# Patient Record
Sex: MALE | Race: WHITE | Employment: FULL TIME | URBAN - METROPOLITAN AREA
[De-identification: names, ages, dates, MRNs, and addresses within clinical notes are randomized per-mention and may not be internally consistent; named-entity substitution may affect disease eponyms.]

---

## 2018-08-08 ENCOUNTER — OFFICE VISIT - HEALTHEAST (OUTPATIENT)
Dept: FAMILY MEDICINE | Facility: CLINIC | Age: 33
End: 2018-08-08

## 2018-08-08 DIAGNOSIS — R79.89 ELEVATED LFTS: ICD-10-CM

## 2018-08-08 DIAGNOSIS — R73.03 PRE-DIABETES: ICD-10-CM

## 2018-08-08 DIAGNOSIS — E78.1 HYPERTRIGLYCERIDEMIA: ICD-10-CM

## 2018-08-08 DIAGNOSIS — E66.9 OBESE: ICD-10-CM

## 2018-08-08 DIAGNOSIS — H34.8392: ICD-10-CM

## 2018-08-08 DIAGNOSIS — Z91.89 AT RISK FOR SLEEP APNEA: ICD-10-CM

## 2018-08-08 DIAGNOSIS — Z00.00 ROUTINE GENERAL MEDICAL EXAMINATION AT A HEALTH CARE FACILITY: ICD-10-CM

## 2018-08-08 LAB
ALBUMIN SERPL-MCNC: 4.5 G/DL (ref 3.5–5)
ALP SERPL-CCNC: 67 U/L (ref 45–120)
ALT SERPL W P-5'-P-CCNC: 114 U/L (ref 0–45)
ANION GAP SERPL CALCULATED.3IONS-SCNC: 12 MMOL/L (ref 5–18)
AST SERPL W P-5'-P-CCNC: 43 U/L (ref 0–40)
BASOPHILS # BLD AUTO: 0.1 THOU/UL (ref 0–0.2)
BASOPHILS NFR BLD AUTO: 1 % (ref 0–2)
BILIRUB SERPL-MCNC: 0.6 MG/DL (ref 0–1)
BUN SERPL-MCNC: 17 MG/DL (ref 8–22)
C REACTIVE PROTEIN LHE: 0.2 MG/DL (ref 0–0.8)
CALCIUM SERPL-MCNC: 10 MG/DL (ref 8.5–10.5)
CHLORIDE BLD-SCNC: 105 MMOL/L (ref 98–107)
CHOLEST SERPL-MCNC: 225 MG/DL
CO2 SERPL-SCNC: 24 MMOL/L (ref 22–31)
CREAT SERPL-MCNC: 0.88 MG/DL (ref 0.7–1.3)
EOSINOPHIL # BLD AUTO: 0.2 THOU/UL (ref 0–0.4)
EOSINOPHIL NFR BLD AUTO: 4 % (ref 0–6)
ERYTHROCYTE [DISTWIDTH] IN BLOOD BY AUTOMATED COUNT: 11.5 % (ref 11–14.5)
ERYTHROCYTE [SEDIMENTATION RATE] IN BLOOD BY WESTERGREN METHOD: 2 MM/HR (ref 0–15)
FASTING STATUS PATIENT QL REPORTED: NO
GFR SERPL CREATININE-BSD FRML MDRD: >60 ML/MIN/1.73M2
GLUCOSE BLD-MCNC: 90 MG/DL (ref 70–125)
HBA1C MFR BLD: 6 % (ref 3.5–6)
HCT VFR BLD AUTO: 50.5 % (ref 40–54)
HDLC SERPL-MCNC: 39 MG/DL
HGB BLD-MCNC: 17.1 G/DL (ref 14–18)
LDLC SERPL CALC-MCNC: 112 MG/DL
LDLC SERPL CALC-MCNC: ABNORMAL MG/DL
LYMPHOCYTES # BLD AUTO: 2.1 THOU/UL (ref 0.8–4.4)
LYMPHOCYTES NFR BLD AUTO: 34 % (ref 20–40)
MCH RBC QN AUTO: 30.3 PG (ref 27–34)
MCHC RBC AUTO-ENTMCNC: 33.8 G/DL (ref 32–36)
MCV RBC AUTO: 90 FL (ref 80–100)
MONOCYTES # BLD AUTO: 0.6 THOU/UL (ref 0–0.9)
MONOCYTES NFR BLD AUTO: 9 % (ref 2–10)
NEUTROPHILS # BLD AUTO: 3.4 THOU/UL (ref 2–7.7)
NEUTROPHILS NFR BLD AUTO: 53 % (ref 50–70)
PLATELET # BLD AUTO: 203 THOU/UL (ref 140–440)
PMV BLD AUTO: 8.5 FL (ref 7–10)
POTASSIUM BLD-SCNC: 4.2 MMOL/L (ref 3.5–5)
PROT SERPL-MCNC: 7.7 G/DL (ref 6–8)
RBC # BLD AUTO: 5.63 MILL/UL (ref 4.4–6.2)
SODIUM SERPL-SCNC: 141 MMOL/L (ref 136–145)
TRIGL SERPL-MCNC: 540 MG/DL
TSH SERPL DL<=0.005 MIU/L-ACNC: 2.12 UIU/ML (ref 0.3–5)
WBC: 6.4 THOU/UL (ref 4–11)

## 2018-08-08 ASSESSMENT — MIFFLIN-ST. JEOR: SCORE: 1993.7

## 2018-08-09 ENCOUNTER — COMMUNICATION - HEALTHEAST (OUTPATIENT)
Dept: LAB | Facility: CLINIC | Age: 33
End: 2018-08-09

## 2018-08-10 ENCOUNTER — COMMUNICATION - HEALTHEAST (OUTPATIENT)
Dept: FAMILY MEDICINE | Facility: CLINIC | Age: 33
End: 2018-08-10

## 2018-08-10 LAB
PROT C ACT/NOR PPP CHRO: 115 % (ref 70–170)
PROT S FREE AG ACT/NOR PPP IA: 144 % (ref 70–148)

## 2018-08-13 ENCOUNTER — COMMUNICATION - HEALTHEAST (OUTPATIENT)
Dept: FAMILY MEDICINE | Facility: CLINIC | Age: 33
End: 2018-08-13

## 2018-08-13 LAB
ALBUMIN PERCENT: 64.5 % (ref 51–67)
ALBUMIN SERPL ELPH-MCNC: 4.9 G/DL (ref 3.2–4.7)
ALPHA 1 PERCENT: 2.1 % (ref 2–4)
ALPHA 2 PERCENT: 9.3 % (ref 5–13)
ALPHA1 GLOB SERPL ELPH-MCNC: 0.2 G/DL (ref 0.1–0.3)
ALPHA2 GLOB SERPL ELPH-MCNC: 0.7 G/DL (ref 0.4–0.9)
B-GLOBULIN SERPL ELPH-MCNC: 0.9 G/DL (ref 0.7–1.2)
BETA PERCENT: 11.4 % (ref 10–17)
GAMMA GLOB SERPL ELPH-MCNC: 1 G/DL (ref 0.6–1.4)
GAMMA GLOBULIN PERCENT: 12.7 % (ref 9–20)
PATH ICD:: ABNORMAL
PROT PATTERN SERPL ELPH-IMP: ABNORMAL
PROT SERPL-MCNC: 7.6 G/DL (ref 6–8)
REVIEWING PATHOLOGIST: ABNORMAL

## 2018-08-21 ENCOUNTER — COMMUNICATION - HEALTHEAST (OUTPATIENT)
Dept: FAMILY MEDICINE | Facility: CLINIC | Age: 33
End: 2018-08-21

## 2018-08-21 ENCOUNTER — AMBULATORY - HEALTHEAST (OUTPATIENT)
Dept: LAB | Facility: CLINIC | Age: 33
End: 2018-08-21

## 2018-08-21 DIAGNOSIS — H34.8392: ICD-10-CM

## 2018-08-21 DIAGNOSIS — R79.89 ELEVATED LFTS: ICD-10-CM

## 2018-08-21 DIAGNOSIS — E78.1 HYPERTRIGLYCERIDEMIA: ICD-10-CM

## 2018-08-21 LAB
ALBUMIN UR-MCNC: NEGATIVE MG/DL
APPEARANCE UR: CLEAR
BACTERIA #/AREA URNS HPF: ABNORMAL HPF
BILIRUB UR QL STRIP: NEGATIVE
COLOR UR AUTO: YELLOW
FERRITIN SERPL-MCNC: 226 NG/ML (ref 27–300)
GLUCOSE UR STRIP-MCNC: NEGATIVE MG/DL
HGB UR QL STRIP: ABNORMAL
IRON SATN MFR SERPL: 39 % (ref 20–50)
IRON SERPL-MCNC: 122 UG/DL (ref 42–175)
KETONES UR STRIP-MCNC: NEGATIVE MG/DL
LEUKOCYTE ESTERASE UR QL STRIP: NEGATIVE
NITRATE UR QL: NEGATIVE
PH UR STRIP: 5.5 [PH] (ref 5–8)
RBC #/AREA URNS AUTO: ABNORMAL HPF
SP GR UR STRIP: 1.02 (ref 1–1.03)
SQUAMOUS #/AREA URNS AUTO: ABNORMAL LPF
TIBC SERPL-MCNC: 315 UG/DL (ref 313–563)
TRANSFERRIN SERPL-MCNC: 252 MG/DL (ref 212–360)
UROBILINOGEN UR STRIP-ACNC: ABNORMAL
WBC #/AREA URNS AUTO: ABNORMAL HPF

## 2018-08-22 LAB
HBV CORE AB SERPL QL IA: NEGATIVE
HBV SURFACE AG SERPL QL IA: NEGATIVE
HCV AB SERPL QL IA: NEGATIVE
HEPATITIS B SURFACE ANTIBODY LHE- HISTORICAL: POSITIVE

## 2018-08-27 LAB — FACTOR 5 LEIDEN MUTAT PCR - HISTORICAL: NORMAL

## 2018-08-28 ENCOUNTER — COMMUNICATION - HEALTHEAST (OUTPATIENT)
Dept: INTERNAL MEDICINE | Facility: CLINIC | Age: 33
End: 2018-08-28

## 2018-08-28 ENCOUNTER — COMMUNICATION - HEALTHEAST (OUTPATIENT)
Dept: FAMILY MEDICINE | Facility: CLINIC | Age: 33
End: 2018-08-28

## 2018-08-29 ENCOUNTER — COMMUNICATION - HEALTHEAST (OUTPATIENT)
Dept: ADMINISTRATIVE | Facility: CLINIC | Age: 33
End: 2018-08-29

## 2018-08-29 ENCOUNTER — COMMUNICATION - HEALTHEAST (OUTPATIENT)
Dept: FAMILY MEDICINE | Facility: CLINIC | Age: 33
End: 2018-08-29

## 2018-09-18 ENCOUNTER — RECORDS - HEALTHEAST (OUTPATIENT)
Dept: ADMINISTRATIVE | Facility: OTHER | Age: 33
End: 2018-09-18

## 2018-12-12 ENCOUNTER — OFFICE VISIT - HEALTHEAST (OUTPATIENT)
Dept: FAMILY MEDICINE | Facility: CLINIC | Age: 33
End: 2018-12-12

## 2018-12-12 ENCOUNTER — COMMUNICATION - HEALTHEAST (OUTPATIENT)
Dept: TELEHEALTH | Facility: CLINIC | Age: 33
End: 2018-12-12

## 2018-12-12 DIAGNOSIS — J06.9 VIRAL UPPER RESPIRATORY TRACT INFECTION: ICD-10-CM

## 2018-12-12 DIAGNOSIS — J20.9 ACUTE BRONCHITIS, UNSPECIFIED ORGANISM: ICD-10-CM

## 2018-12-12 RX ORDER — ALBUTEROL SULFATE 90 UG/1
2 AEROSOL, METERED RESPIRATORY (INHALATION) EVERY 6 HOURS PRN
Qty: 1 EACH | Refills: 0 | Status: SHIPPED | OUTPATIENT
Start: 2018-12-12 | End: 2023-07-31

## 2018-12-12 ASSESSMENT — MIFFLIN-ST. JEOR: SCORE: 2020.91

## 2018-12-24 ENCOUNTER — COMMUNICATION - HEALTHEAST (OUTPATIENT)
Dept: SCHEDULING | Facility: CLINIC | Age: 33
End: 2018-12-24

## 2018-12-28 ENCOUNTER — COMMUNICATION - HEALTHEAST (OUTPATIENT)
Dept: SCHEDULING | Facility: CLINIC | Age: 33
End: 2018-12-28

## 2019-01-02 ENCOUNTER — OFFICE VISIT - HEALTHEAST (OUTPATIENT)
Dept: FAMILY MEDICINE | Facility: CLINIC | Age: 34
End: 2019-01-02

## 2019-01-02 DIAGNOSIS — R05.9 COUGH: ICD-10-CM

## 2019-01-02 DIAGNOSIS — H34.8122 CENTRAL RETINAL VEIN OCCLUSION OF LEFT EYE, UNSPECIFIED COMPLICATION STATUS (H): ICD-10-CM

## 2019-01-02 RX ORDER — CODEINE PHOSPHATE AND GUAIFENESIN 10; 100 MG/5ML; MG/5ML
10 SOLUTION ORAL 3 TIMES DAILY PRN
Qty: 240 ML | Refills: 0 | Status: SHIPPED | OUTPATIENT
Start: 2019-01-02 | End: 2023-07-31

## 2019-01-02 RX ORDER — CYCLOBENZAPRINE HCL 10 MG
5 TABLET ORAL EVERY 8 HOURS PRN
Qty: 30 TABLET | Refills: 1 | Status: SHIPPED | OUTPATIENT
Start: 2019-01-02 | End: 2023-07-31

## 2021-05-26 ENCOUNTER — RECORDS - HEALTHEAST (OUTPATIENT)
Dept: ADMINISTRATIVE | Facility: CLINIC | Age: 36
End: 2021-05-26

## 2021-06-01 VITALS — BODY MASS INDEX: 33.5 KG/M2 | HEIGHT: 70 IN | WEIGHT: 234 LBS

## 2021-06-02 VITALS — BODY MASS INDEX: 31.87 KG/M2 | WEIGHT: 228.5 LBS

## 2021-06-02 VITALS — BODY MASS INDEX: 34.36 KG/M2 | HEIGHT: 70 IN | WEIGHT: 240 LBS

## 2021-06-16 PROBLEM — E78.1 HYPERTRIGLYCERIDEMIA: Status: ACTIVE | Noted: 2018-08-09

## 2021-06-16 PROBLEM — E66.9 OBESE: Status: ACTIVE | Noted: 2018-08-08

## 2021-06-16 PROBLEM — R79.89 ELEVATED LFTS: Status: ACTIVE | Noted: 2018-08-09

## 2021-06-16 PROBLEM — R73.03 PRE-DIABETES: Status: ACTIVE | Noted: 2018-08-09

## 2021-06-16 PROBLEM — H34.8392: Status: ACTIVE | Noted: 2018-08-08

## 2021-06-19 NOTE — PROGRESS NOTES
"[facility] Clinic Note    Patient Name: Vinayak Jarquin  Patient Age: 33 y.o.  YOB: 1985  MRN: [id number]    Date of visit: 8/8/2018    Patient Active Problem List   Diagnosis     Exanthem     Venous retinal branch occlusion     Obese     Social History     Social History Narrative     No narrative on file     No family history on file.  No past surgical history on file.  No outpatient encounter prescriptions on file as of 8/8/2018.     No facility-administered encounter medications on file as of 8/8/2018.        Chief Complaint:   Chief Complaint   Patient presents with     Annual Exam     Would like some blood work done. Is getting another injection in the left eye. Has already has one.        HPI:   Occupation:[employer]  Marital status:single  Number of children:no  Living situation:girlfriend  Diet:poor  Exercise:Doesn't exercise  Alcohol use:weekends only, 10/week  Tobacco use:no  Illicit drug use:no  Depression:no  Last tetanus:?  HIV testing:?  Lipids/glucose:somewhat high  Blood pressure:always high end.    Mother/Father/Siblings MI:no  Fam hx colon or prostate cancer or kidney disease:no  Spirituality:no  Last visit to dentist:y  Optometrist:recent    No urination difficulty.  No ED    No cp/shob    Left central vein occlusion. Seeing a vitreoretinal surgeon for this, getting injections for this.  No hx dvt or pe.  No family hx blood clots.          Wt Readings from Last 3 Encounters:   08/08/18 (!) 234 lb (106.1 kg)     BP Readings from Last 3 Encounters:   08/08/18 132/84       ROS: Pertinent ros findings in hpi, all other systems negative.  Objective/Physical Exam:     /84  Pulse 86  Ht 5' 9.75\" (1.772 m)  Wt (!) 234 lb (106.1 kg)  BMI 33.82 kg/m2    Gen: NAD, conversant, appears age, well-kempt  Skin: warm, dry, no rash, pallor cyanosis  HENT: normocephalic atraumatic, MMM, no oral lesions, otorrhea, rhinorrhea. TM's normal bilaterally.  Eyes: non-icteric, extra-ocular " movements intact, PERRL, conjunctivae not injected. Holding eyes open comfortably, no drainage.  CV: NRRR no m/r/g, no peripheral edema. no JVD.  Resp: CTAB no w/r/r, normal respiratory effort  GI: soft, non-tender, non-distended. No masses.  MSK: no muscle or joint swelling.  Neuro: no dysarthria or gross asymmetry  Psych: full affect, oriented x 3  Lymph: No significant cervical lymphadenopathy  Hematologic: No petechiae or purpura.    Normal male external genitalia    ______________________________________________________________________    STOPBANG CHELSEY ASSESSMENT    1.  Do you snore loudly (louder than talking or loud enough to be heard through closed doors):  somewhat    2.  Do you often feel tired, fatigued, or sleepy during the day:  n    3.  Has anyone observed you stop breathing during sleep:  y    4.  Do you have or are you being treated for high blood pressure:  n    5.  Body mass index > 35:  Body mass index is 33.82 kg/(m^2).    6.  Age > 50:  33 y.o.     7.  Neck circumference greater than 40 cm:  y    8.  Gender male:  male     Total score:  4    A score of 3 or greater indicates a risk for sleep apnea (84% sensitivity).  A score of 5 or greater is more predictive of clinically relevant moderate to severe obstructive sleep apnea.    ______________________________________________________________________        Assessment/Plan:  No results found for this or any previous visit (from the past 24 hour(s)).    Wt Readings from Last 3 Encounters:   08/08/18 (!) 234 lb (106.1 kg)     BP Readings from Last 3 Encounters:   08/08/18 132/84       No Data Recorded  No Data Recorded    Vaccinations: tdap        ICD-10-CM    1. Routine general medical examination at a health care facility Z00.00 Lipid Cascade     Glycosylated Hemoglobin A1c     Comprehensive Metabolic Panel     HM1(CBC and Differential)     Thyroid Stimulating Hormone (TSH)     Erythrocyte Sedimentation Rate     C-Reactive Protein     Factor 5 Assay      Protein C Activity ([facility])     Protein S Antigen, Free (PSF)     Electrophoresis, Protein, Serum     HM1 (CBC with Diff)   2. Obese E66.9 Lipid Cascade     Glycosylated Hemoglobin A1c     Comprehensive Metabolic Panel     HM1(CBC and Differential)     Thyroid Stimulating Hormone (TSH)     Erythrocyte Sedimentation Rate     C-Reactive Protein     Factor 5 Assay     Protein C Activity (PCC[facility])     Protein S Antigen, Free (PSF)     Electrophoresis, Protein, Serum     HM1 (CBC with Diff)   3. At risk for sleep apnea Z91.89 Ambulatory referral to Sleep Medicine   4. Venous retinal branch occlusion H34.8392 Lipid Cascade     Glycosylated Hemoglobin A1c     Comprehensive Metabolic Panel     HM1(CBC and Differential)     Thyroid Stimulating Hormone (TSH)     Erythrocyte Sedimentation Rate     C-Reactive Protein     Factor 5 Assay     Protein C Activity (PCC[facility])     Protein S Antigen, Free (PSF)     Electrophoresis, Protein, Serum     HM1 (CBC with Diff)       I am doing a hypercoagulability workup requested by his retinal specialist, we may consider doing further tests if these come back normal.  However, other testing is somewhat expensive.  We discussed at length dietary and lifestyle changes.  Lab work as above.  I recommend sleep study because he is at risk for sleep apnea.  He would like results faxed to [phone] Dr. Gray  We also discussed whether or not he would need long-term anticoagulation - will await results from these tests, may consult hematology but at this point retinal specialist has not recommended it.        Patient Instructions   Plant Based Diet Handout    Eat abundant vegetables.    Only eat whole grains.    2-4 fruits/day.    Enjoy at least 2 servings of legumes (beans) or tofu daily.      Avoid animal products such as dairy, eggs and meat. (Replace these with 1,000mcg vitamin B12 and a calcium/vitamin D supplement such as Caltrate+D3 daily.)    Avoid processed foods, sugars and oils.    Cook  your own food as much as possible.    Keep a food diary and ask someone else to diet with you.    Drink 8 glasses of water a day.    [place] [employer] Documentary - watch online.        Counseled patient regarding healthy lifestyle including exercise, healthy eating. I recommend seeing optometrist and dentist regularly.    Counseled patient regarding treatments, treatment options, risks and benefits and diagnosis.  The patient was interactive, attentive, verbalized understanding, and we discussed plan.     Wolf Pacheco MD

## 2021-06-22 NOTE — PROGRESS NOTES
"Family Medicine Office Visit  [facility]  Patient Name: Vinayak Jarquin  Patient Age: 33 y.o.  YOB: 1985  MRN: [id number]    Date of Visit: 2019  Reason for Office Visit:   Chief Complaint   Patient presents with     Hospital Visit Follow Up     [facility] 17 for chest pain form coughing Continous Cough for 2 mo           Assessment / Plan / Medical Decision Makin. Cough  Will change antibiotic to doxycycline and see if any improvement.  Called in cough syrup.  Planned to do steroid, however pt has a hx of CRVO clot and currently seeing ophthalmology.      2.  Central retinal vein occulsion  Continue to follow up with ophthalmology        Health Maintenance Review  Health Maintenance   Topic Date Due     ADVANCE DIRECTIVES DISCUSSED WITH PATIENT  2002     INFLUENZA VACCINE RULE BASED (1) 2018     TD 18+ HE  2028     TDAP ADULT ONE TIME DOSE  Completed         I have discontinued Vinayak Jarquin's benzonatate, oxyCODONE, and methylPREDNISolone. I am also having him start on doxycycline, codeine-guaiFENesin, and cyclobenzaprine. Additionally, I am having him maintain his albuterol.      HPI:  Vinayak Jarquin is a 33 y.o. year old who presents to the office today for follow up on coughing.  Still coughing daily - went to the ER on  after coughing fit and felt like something \"popped\" on the left side of the chest.  Given oxycodone for the pain but did not help with the coughing.  Finished z pack and didn't feel like it helped at all.  Using inhaler and feels like it helps some.  CXR done and normal.  Still having pain in the rib area.  Coughing so much that he reports vomited once.  Hx of cental retinal vein occulsion and currently seeing ophthalmology for eye injections.      Review of Systems- pertinent positive in bold:  Constitutional: Fever, chills, night sweats, fainting, weight change, fatigue, seizures, dizziness, sleeping " difficulties, loud snoring/pauses in breathing  Eyes: change in vision, blurred or double vision, redness/eye pain  Ears, nose, mouth, throat: change in hearing, ear pain, hoarseness, difficulty swallowing, sores in the mouth or throat  Respiratory: shortness of breath, cough, bloody sputum, wheezing  Cardiovascular: chest pain, palpitations   Gastrointestinal: abdominal pain, heartburn/indigestion, nausea/vomiting, change in appetite, change in bowel habits, constipation or diarrhea, rectal bleeding/dark stools, difficulty swallowing  Urinary: painful urination, frequent urination, urinary urgency/incontinence, blood in urine/dark urine, nocturia  Musculoskeletal: backache/back pain (new or increasing), weakness, joint pain/stiffness (new or increasing), muscle cramps, swelling of hands, feet, ankles, leg pain/redness  Skin: change in moles/freckles, rash, nodules  Hematologic/lymphatic: swollen lymph glands, abnormal bruising/bleeding  Endocrine: excessive thirst/urination, cold or heat intolerance  Neurologic/emotional: worrisome memory change, numbness/tingling, anxiety, mood swings      Current Scheduled Meds:  Outpatient Encounter Medications as of 1/2/2019   Medication Sig Dispense Refill     albuterol (PROAIR HFA;PROVENTIL HFA;VENTOLIN HFA) 90 mcg/actuation inhaler Inhale 2 puffs every 6 (six) hours as needed for wheezing. 1 each 0     codeine-guaiFENesin (GUAIFENESIN AC)  mg/5 mL liquid Take 10 mL by mouth 3 (three) times a day as needed for cough. 240 mL 0     cyclobenzaprine (FLEXERIL) 10 MG tablet Take 0.5 tablets (5 mg total) by mouth every 8 (eight) hours as needed for muscle spasms. 30 tablet 1     doxycycline (VIBRA-TABS) 100 MG tablet Take 1 tablet (100 mg total) by mouth 2 (two) times a day for 10 days. 20 tablet 0     [DISCONTINUED] benzonatate (TESSALON PERLES) 100 MG capsule Take 1 capsule (100 mg total) by mouth 3 (three) times a day as needed for cough. 30 capsule 0     [DISCONTINUED]  methylPREDNISolone (MEDROL DOSEPACK) 4 mg tablet Take 1 tablet (4 mg total) by mouth daily for 6 days. Follow package directions 21 tablet 0     [DISCONTINUED] oxyCODONE (OXY-IR) 5 mg capsule Take 1 capsule (5 mg total) by mouth every 4 (four) hours as needed. 13 capsule 0     Facility-Administered Encounter Medications as of 1/2/2019   Medication Dose Route Frequency Provider Last Rate Last Dose     [DISCONTINUED] triamcinolone acetonide 40 mg/mL injection 40 mg (KENALOG-40)  40 mg Intramuscular Once Neha Watkins MD         No past medical history on file.  No past surgical history on file.  Social History     Tobacco Use     Smoking status: Never Smoker     Smokeless tobacco: Never Used   Substance Use Topics     Alcohol use: Not on file     Drug use: Not on file       Objective / Physical Examination:  Vitals:    01/02/19 1008   BP: 142/90   Patient Site: Right Arm   Patient Position: Sitting   Cuff Size: Adult Large   Pulse: 82   Temp: 98.1  F (36.7  C)   TempSrc: Oral   SpO2: 96%   Weight: (!) 228 lb 8 oz (103.6 kg)     Wt Readings from Last 3 Encounters:   01/02/19 (!) 228 lb 8 oz (103.6 kg)   12/24/18 (!) 230 lb (104.3 kg)   12/12/18 (!) 240 lb (108.9 kg)     BP Readings from Last 3 Encounters:   01/02/19 142/90   12/24/18 (!) 167/101   12/12/18 128/80     Body mass index is 31.87 kg/m .     General Appearance: Alert and oriented, cooperative, affect appropriate, speech clear, in no apparent distress  Head: Normocephalic, atraumatic  Ears: Tympanic membrane clear with landmarks well visualized bilaterally  Eyes: PERRL, fundi appear clear bilaterally. EOMI. Conjunctivae clear and sclerae non-icteric  Nose: Septum midline, nares patent, no visible polyps, mucosa moist and without drainage  Throat: Lips and mucosa moist. Teeth in good repair, pharynx without erythema or exudate  Neck: Supple, trachea midline. No cervical adenopathy  Back: Symmetrical and nontender  Lungs: Clear to auscultation bilaterally.  Normal inspiratory and expiratory effort  Cardiovascular: Regular rate, normal S1, S2. No murmurs, rubs, or gallops  Abdomen: Bowel sounds active all four quadrants. Soft, non-tender. No hepatomegaly or splenomegaly. No bruits detected.   Extremities: Pulses 2+ and equal throughout. No edema. Strength equal throughout.  Integumentary: Warm and dry. Without suspicious looking lesions  Neuro: Alert and oriented, follows commands appropriately.     No orders of the defined types were placed in this encounter.  Followup: Return in about 4 days (around 1/6/2019) for Recheck. earlier if needed.    Total time spent with patient was 30 min with >50% of time spent in face-to-face counseling regarding the above plan       Neha Watkins MD

## 2021-06-22 NOTE — PROGRESS NOTES
Family Medicine Office Visit  [facility]  Patient Name: Vinayak Jarquin  Patient Age: 33 y.o.  YOB: 1985  MRN: [id number]    Date of Visit: 2018  Reason for Office Visit:   Chief Complaint   Patient presents with     Cough     x 2 weeks. Low energy. SOB with exertion.            Assessment / Plan / Medical Decision Makin. Viral upper respiratory tract infection  Continue with ibuprofen/tylenol for muscle aches and sudafed as needed for congestion.    2. Acute bronchitis, unspecified organism  Called in z pack, tessalon perles and albuterol to help.  If no improvement by Friday, call the office or return to clinic.        Health Maintenance Review  Health Maintenance   Topic Date Due     ADVANCE DIRECTIVES DISCUSSED WITH PATIENT  2002     INFLUENZA VACCINE RULE BASED (1) 2018     TD 18+ HE  2028     TDAP ADULT ONE TIME DOSE  Completed         I am having Vinayak Jarquin start on azithromycin, benzonatate, and albuterol.      HPI:  Vinayak Jarquin is a 33 y.o. year old who presents to the office today for coughing, congestion, body aches and fevers for the past 2 weeks.   Fever highest temp of 102 but now resolved.  Coughing and bringing up yellow sputum.  Ha due to pressure he thinks, no fevers in the past 5 days.  Sore throat, ears itchying but no pain, no sinus tenderness.  Coughing keeping him up at night.        Review of Systems- pertinent positive in bold:  Constitutional: Fever, chills, night sweats, fainting, weight change, fatigue, seizures, dizziness, sleeping difficulties, loud snoring/pauses in breathing  Eyes: change in vision, blurred or double vision, redness/eye pain  Ears, nose, mouth, throat: change in hearing, ear pain, hoarseness, difficulty swallowing, sores in the mouth or throat  Respiratory: shortness of breath, cough, bloody sputum, wheezing  Cardiovascular: chest pain, palpitations   Gastrointestinal: abdominal  "pain, heartburn/indigestion, nausea/vomiting, change in appetite, change in bowel habits, constipation or diarrhea, rectal bleeding/dark stools, difficulty swallowing  Urinary: painful urination, frequent urination, urinary urgency/incontinence, blood in urine/dark urine, nocturia  Musculoskeletal: backache/back pain (new or increasing), weakness, joint pain/stiffness (new or increasing), muscle cramps, swelling of hands, feet, ankles, leg pain/redness  Skin: change in moles/freckles, rash, nodules  Hematologic/lymphatic: swollen lymph glands, abnormal bruising/bleeding  Endocrine: excessive thirst/urination, cold or heat intolerance  Neurologic/emotional: worrisome memory change, numbness/tingling, anxiety, mood swings      Current Scheduled Meds:  Outpatient Encounter Medications as of 12/12/2018   Medication Sig Dispense Refill     albuterol (PROAIR HFA;PROVENTIL HFA;VENTOLIN HFA) 90 mcg/actuation inhaler Inhale 2 puffs every 6 (six) hours as needed for wheezing. 1 each 0     azithromycin (ZITHROMAX Z-MILTON) 250 MG tablet Take 2 tablets (500 mg) on  Day 1,  followed by 1 tablet (250 mg) once daily on Days 2 through 5.. 6 tablet 0     benzonatate (TESSALON PERLES) 100 MG capsule Take 1 capsule (100 mg total) by mouth 3 (three) times a day as needed for cough. 30 capsule 0     No facility-administered encounter medications on file as of 12/12/2018.      No past medical history on file.  No past surgical history on file.  Social History     Tobacco Use     Smoking status: Never Smoker     Smokeless tobacco: Never Used   Substance Use Topics     Alcohol use: Not on file     Drug use: Not on file       Objective / Physical Examination:  Vitals:    12/12/18 0701   BP: 128/80   Pulse: 80   Resp: 16   Temp: 98  F (36.7  C)   SpO2: 93%   Weight: (!) 240 lb (108.9 kg)   Height: 5' 9.75\" (1.772 m)     Wt Readings from Last 3 Encounters:   12/12/18 (!) 240 lb (108.9 kg)   08/08/18 (!) 234 lb (106.1 kg)     BP Readings from " Last 3 Encounters:   12/12/18 128/80   08/08/18 132/84     Body mass index is 34.68 kg/m .     General Appearance: Alert and oriented, cooperative, affect appropriate, speech clear, in no apparent distress  Head: Normocephalic, atraumatic  Ears: Tympanic membrane clear with landmarks well visualized bilaterally  Eyes: PERRL, fundi appear clear bilaterally. EOMI. Conjunctivae clear and sclerae non-icteric  Nose: Septum midline, nares patent, no visible polyps, mucosa moist and without drainage  Throat: Lips and mucosa moist. Teeth in good repair, pharynx without erythema or exudate  Neck: Supple, trachea midline. No cervical adenopathy  Back: Symmetrical and nontender  Lungs: Clear to auscultation bilaterally. Normal inspiratory and expiratory effort  Cardiovascular: Regular rate, normal S1, S2. No murmurs, rubs, or gallops  Abdomen: Bowel sounds active all four quadrants. Soft, non-tender. No hepatomegaly or splenomegaly. No bruits detected.   Extremities: Pulses 2+ and equal throughout. No edema. Strength equal throughout.  Integumentary: Warm and dry. Without suspicious looking lesions  Neuro: Alert and oriented, follows commands appropriately.     No orders of the defined types were placed in this encounter.  Followup: No Follow-up on file. earlier if needed.    Total time spent with patient was 15 min with >50% of time spent in face-to-face counseling regarding the above plan       Neha Watkins MD

## 2021-07-03 NOTE — ADDENDUM NOTE
Addendum Note by Drew Pacheco MD at 8/9/2018  9:57 AM     Author: Drew Pacheco MD Service: -- Author Type: Physician    Filed: 8/9/2018  9:57 AM Encounter Date: 8/8/2018 Status: Signed    : Drew Pacheco MD (Physician)    Addended by: DREW PACHECO on: 8/9/2018 09:57 AM        Modules accepted: Orders

## 2021-07-03 NOTE — ADDENDUM NOTE
Addendum Note by Edilia Fitzpatrick MLT at 8/9/2018  5:35 PM     Author: Edilia Fitzpatrick MLT Service: -- Author Type:     Filed: 8/9/2018  5:35 PM Encounter Date: 8/8/2018 Status: Signed    : Edilia Fitzpatrick MLT ()    Addended by: EDILIA FITZPATRICK on: 8/9/2018 05:35 PM        Modules accepted: Orders

## 2021-07-03 NOTE — ADDENDUM NOTE
Addendum Note by Drew Pacheco MD at 8/9/2018  2:33 PM     Author: Drew Pacheco MD Service: -- Author Type: Physician    Filed: 8/9/2018  2:33 PM Encounter Date: 8/8/2018 Status: Signed    : Drew Pacheco MD (Physician)    Addended by: DREW PACHECO on: 8/9/2018 02:33 PM        Modules accepted: Orders

## 2022-11-18 ENCOUNTER — TRANSFERRED RECORDS (OUTPATIENT)
Dept: EMERGENCY MEDICINE | Facility: HOSPITAL | Age: 37
End: 2022-11-18

## 2023-05-13 ENCOUNTER — TRANSFERRED RECORDS (OUTPATIENT)
Dept: MULTI SPECIALTY CLINIC | Facility: CLINIC | Age: 38
End: 2023-05-13

## 2023-05-13 LAB — RETINOPATHY: NORMAL

## 2023-07-31 ENCOUNTER — OFFICE VISIT (OUTPATIENT)
Dept: FAMILY MEDICINE | Facility: CLINIC | Age: 38
End: 2023-07-31
Payer: COMMERCIAL

## 2023-07-31 VITALS
SYSTOLIC BLOOD PRESSURE: 141 MMHG | HEART RATE: 82 BPM | BODY MASS INDEX: 31.11 KG/M2 | RESPIRATION RATE: 16 BRPM | WEIGHT: 222.2 LBS | HEIGHT: 71 IN | OXYGEN SATURATION: 96 % | DIASTOLIC BLOOD PRESSURE: 97 MMHG

## 2023-07-31 DIAGNOSIS — Z11.4 SCREENING FOR HIV (HUMAN IMMUNODEFICIENCY VIRUS): ICD-10-CM

## 2023-07-31 DIAGNOSIS — R03.0 ELEVATED BLOOD PRESSURE READING WITHOUT DIAGNOSIS OF HYPERTENSION: ICD-10-CM

## 2023-07-31 DIAGNOSIS — R74.8 ELEVATED LIVER ENZYMES: ICD-10-CM

## 2023-07-31 DIAGNOSIS — E78.1 HYPERTRIGLYCERIDEMIA: ICD-10-CM

## 2023-07-31 DIAGNOSIS — Z01.818 PREOP GENERAL PHYSICAL EXAM: Primary | ICD-10-CM

## 2023-07-31 DIAGNOSIS — R73.03 PRE-DIABETES: ICD-10-CM

## 2023-07-31 PROCEDURE — 99204 OFFICE O/P NEW MOD 45 MIN: CPT | Performed by: FAMILY MEDICINE

## 2023-07-31 NOTE — PATIENT INSTRUCTIONS
For informational purposes only. Not to replace the advice of your health care provider. Copyright   2003,  [employer] [facility]. All rights reserved. Clinically reviewed by Faviola Pickard MD. [employer] [id number] - REV .  Preparing for Your Surgery  Getting started  A nurse will call you to review your health history and instructions. They will give you an arrival time based on your scheduled surgery time. Please be ready to share:  Your doctor's clinic name and phone number  Your medical, surgical, and anesthesia history  A list of allergies and sensitivities  A list of medicines, including herbal treatments and over-the-counter drugs  Whether the patient has a legal guardian (ask how to send us the papers in advance)  Please tell us if you're pregnant--or if there's any chance you might be pregnant. Some surgeries may injure a fetus (unborn baby), so they require a pregnancy test. Surgeries that are safe for a fetus don't always need a test, and you can choose whether to have one.   If you have a child who's having surgery, please ask for a copy of Preparing for Your Child's Surgery.    Preparing for surgery  Within 10 to 30 days of surgery: Have a pre-op exam (sometimes called an H&P, or History and Physical). This can be done at a clinic or pre-operative center.  If you're having a , you may not need this exam. Talk to your care team.  At your pre-op exam, talk to your care team about all medicines you take. If you need to stop any medicines before surgery, ask when to start taking them again.  We do this for your safety. Many medicines can make you bleed too much during surgery. Some change how well surgery (anesthesia) drugs work.  Call your insurance company to let them know you're having surgery. (If you don't have insurance, call [phone].)  Call your clinic if there's any change in your health. This includes signs of a cold or flu (sore throat, runny nose, cough, rash, fever). It also  includes a scrape or scratch near the surgery site.  If you have questions on the day of surgery, call your hospital or surgery center.  Eating and drinking guidelines  For your safety: Unless your surgeon tells you otherwise, follow the guidelines below.  Eat and drink as usual until 8 hours before you arrive for surgery. After that, no food or milk.  Drink clear liquids until 2 hours before you arrive. These are liquids you can see through, like water, Gatorade, and Propel Water. They also include plain black coffee and tea (no cream or milk), candy, and breath mints. You can spit out gum when you arrive.  If you drink alcohol: Stop drinking it the night before surgery.  If your care team tells you to take medicine on the morning of surgery, it's okay to take it with a sip of water.  Preventing infection  Shower or bathe the night before and morning of your surgery. Follow the instructions your clinic gave you. (If no instructions, use regular soap.)  Don't shave or clip hair near your surgery site. We'll remove the hair if needed.  Don't smoke or vape the morning of surgery. You may chew nicotine gum up to 2 hours before surgery. A nicotine patch is okay.  Note: Some surgeries require you to completely quit smoking and nicotine. Check with your surgeon.  Your care team will make every effort to keep you safe from infection. We will:  Clean our hands often with soap and water (or an alcohol-based hand rub).  Clean the skin at your surgery site with a special soap that kills germs.  Give you a special gown to keep you warm. (Cold raises the risk of infection.)  Wear special hair covers, masks, gowns and gloves during surgery.  Give antibiotic medicine, if prescribed. Not all surgeries need antibiotics.  What to bring on the day of surgery  Photo ID and insurance card  Copy of your health care directive, if you have one  Glasses and hearing aids (bring cases)  You can't wear contacts during surgery  Inhaler and eye  drops, if you use them (tell us about these when you arrive)  CPAP machine or breathing device, if you use them  A few personal items, if spending the night  If you have . . .  A pacemaker, ICD (cardiac defibrillator) or other implant: Bring the ID card.  An implanted stimulator: Bring the remote control.  A legal guardian: Bring a copy of the certified (court-stamped) guardianship papers.  Please remove any jewelry, including body piercings. Leave jewelry and other valuables at home.  If you're going home the day of surgery  You must have a responsible adult drive you home. They should stay with you overnight as well.  If you don't have someone to stay with you, and you aren't safe to go home alone, we may keep you overnight. Insurance often won't pay for this.  After surgery  If it's hard to control your pain or you need more pain medicine, please call your surgeon's office.  Questions?   If you have any questions for your care team, list them here: _________________________________________________________________________________________________________________________________________________________________________ ____________________________________ ____________________________________ ____________________________________    How to Take Your Medication Before Surgery  - STOP taking all vitamins and herbal supplements 14 days before surgery.

## 2023-07-31 NOTE — PROGRESS NOTES
[facility]  [address] [facility] [address]  Phone: [phone]  Fax: [phone]  Primary Provider: Luther Cooper  Pre-op Performing Provider: LUTHER COOPER      PREOPERATIVE EVALUATION:  Today's date: 7/31/2023    Vinayak Jarquin is a 38 year old male who presents for a preoperative evaluation.      7/31/2023     1:43 PM   Additional Questions   Roomed by Moira Loza CMA   Accompanied by N/A       Surgical Information:  Surgery/Procedure: Left eye surgery  Surgery Location: [facility]  Surgeon: Dr. Cardoza   Surgery Date: Unknown right now   Time of Surgery: Unknown   Where patient plans to recover: At home with family  Fax number for surgical facility: [phone]    Assessment & Plan     ICD-10-CM    1. Preop general physical exam  Z01.818       2. Screening for HIV (human immunodeficiency virus)  Z11.4 HIV Antigen Antibody Combo      3. Hypertriglyceridemia  E78.1 Lipid panel reflex to direct LDL Non-fasting      4. Pre-diabetes  R73.03 Hemoglobin A1c      5. Elevated liver enzymes  R74.8 Comprehensive metabolic panel (BMP + Alb, Alk Phos, ALT, AST, Total. Bili, TP)        Patient is here today to get a preop for cataract surgery.  Reviewed his chart.  He has not seen primary care for some time.  Noted a previously elevated A1c, triglyceride and liver enzymes.  He does inform me that he has lost weight since then.  Advise to recheck these numbers.  He defers to a future lab draw.  Labs ordered as above.    Blood pressure is noted to be elevated.  Reviewed that it was also elevated during visit to eye specialist.  Patient is trying to do healthy lifestyle modification for blood pressure control.  Advise keeping a close check and discussed about medication and benefits to keep blood pressure under good control.    The proposed surgical procedure is considered LOW risk.            - No identified additional risk factors other than  previously addressed    Antiplatelet or Anticoagulation Medication Instructions:   - Patient is on no antiplatelet or anticoagulation medications.    Additional Medication Instructions:  Patient is on no additional chronic medications    RECOMMENDATION:  APPROVAL GIVEN to proceed with proposed procedure, without further diagnostic evaluation.            Subjective   Chief Complaint   Patient presents with    Pre-Op Exam     DOS unknown right now. Left eye surgery. @ [facility] in [place]. With Dr. Cardoza.          HPI related to upcoming procedure: Cataract surgery        7/31/2023     1:32 PM   Preop Questions   1. Have you ever had a heart attack or stroke? No   2. Have you ever had surgery on your heart or blood vessels, such as a stent placement, a coronary artery bypass, or surgery on an artery in your head, neck, heart, or legs? No   3. Do you have chest pain with activity? No   4. Do you have a history of  heart failure? No   5. Do you currently have a cold, bronchitis or symptoms of other infection? No   6. Do you have a cough, shortness of breath, or wheezing? No   7. Do you or anyone in your family have previous history of blood clots? YES - Retinal vein occlusion   8. Do you or does anyone in your family have a serious bleeding problem such as prolonged bleeding following surgeries or cuts? No   9. Have you ever had problems with anemia or been told to take iron pills? No   10. Have you had any abnormal blood loss such as black, tarry or bloody stools? No   11. Have you ever had a blood transfusion? No   12. Are you willing to have a blood transfusion if it is medically needed before, during, or after your surgery? Yes   13. Have you or any of your relatives ever had problems with anesthesia? No   14. Do you have sleep apnea, excessive snoring or daytime drowsiness? No   15. Do you have any artifical heart valves or other implanted medical devices like a pacemaker, defibrillator, or continuous  "glucose monitor? No   16. Do you have artificial joints? No   17. Are you allergic to latex? No       Health Care Directive:  Patient does not have a Health Care Directive or Living Will: Discussed advance care planning with patient; however, patient declined at this time.    Preoperative Review of :   reviewed - no record of controlled substances prescribed.      Status of Chronic Conditions:  HYPERTENSION - Patient has longstanding history of HTN , currently denies any symptoms referable to elevated blood pressure. Specifically denies chest pain, palpitations, dyspnea, orthopnea, PND or peripheral edema. Blood pressure readings have not been in normal range.  Not on any blood pressure    Review of Systems  Constitutional, neuro, ENT, endocrine, pulmonary, cardiac, gastrointestinal, genitourinary, musculoskeletal, integument and psychiatric systems are negative, except as otherwise noted.    Patient Active Problem List    Diagnosis Date Noted    Elevated LFTs 08/09/2018     Priority: Medium    Hypertriglyceridemia 08/09/2018     Priority: Medium    Pre-diabetes 08/09/2018     Priority: Medium    Venous retinal branch occlusion 08/08/2018     Priority: Medium    Obese 08/08/2018     Priority: Medium    Exanthem      Priority: Medium     Created by Conversion          History reviewed. No pertinent past medical history.  History reviewed. No pertinent surgical history.  No current outpatient medications on file.       No Known Allergies     Social History     Tobacco Use    Smoking status: Never    Smokeless tobacco: Never   Substance Use Topics    Alcohol use: Not on file     Family History   Problem Relation Age of Onset    Hypertension Father      History   Drug Use Not on file         Objective     BP (!) 143/100   Pulse 86   Resp 16   Ht 1.803 m (5' 11\")   Wt 100.8 kg (222 lb 3.2 oz)   SpO2 96%   BMI 30.99 kg/m      Physical Exam  GENERAL APPEARANCE: healthy, alert and no distress  HENT: ear canals " and TM's normal and nose and mouth without ulcers or lesions  RESP: lungs clear to auscultation - no rales, rhonchi or wheezes  CV: regular rate and rhythm, normal S1 S2, no S3 or S4 and no murmur, click or rub   ABDOMEN: soft, nontender, no HSM or masses and bowel sounds normal  NEURO: Normal strength and tone, sensory exam grossly normal, mentation intact and speech normal  PSYCH: mentation appears normal and affect normal/bright    No results for input(s): HGB, PLT, INR, NA, POTASSIUM, CR, A1C in the last 17480 hours.     Diagnostics:  Labs pending at this time.  Results will be reviewed when available.   No EKG required for low risk surgery (cataract, skin procedure, breast biopsy, etc).    Revised Cardiac Risk Index (RCRI):  The patient has the following serious cardiovascular risks for perioperative complications:   - No serious cardiac risks = 0 points     RCRI Interpretation: 0 points: Class I (very low risk - 0.4% complication rate)         Signed Electronically by: Florentino Agrawal MD  Copy of this evaluation report is provided to requesting physician.

## 2023-08-08 ENCOUNTER — TELEPHONE (OUTPATIENT)
Dept: FAMILY MEDICINE | Facility: CLINIC | Age: 38
End: 2023-08-08
Payer: COMMERCIAL

## 2023-08-08 NOTE — TELEPHONE ENCOUNTER
Advised that he submit an E-visit with his concerns and where he would like the results to be sent to for further evaluation.    Florentino Agrawal MD

## 2023-08-08 NOTE — TELEPHONE ENCOUNTER
Patient/spouse are requesting orders for semen analysis. Per chart review, patient had 7/31 OV with PCP for pre-op. I do not see that this was discussed at visit, but will route to PCP to advise on how to proceed. Does Dr. ESCOBEDO typically place these orders or are patients referred somewhere else?    Lo Rich RN

## 2023-08-08 NOTE — TELEPHONE ENCOUNTER
Left message to call back for Vinayak. Consent not on file for spouse.   He doesn't have [employer] but can send him the activation code so he can sign up for [employer] and submit e-visit.

## 2023-08-09 ENCOUNTER — LAB (OUTPATIENT)
Dept: LAB | Facility: CLINIC | Age: 38
End: 2023-08-09
Payer: COMMERCIAL

## 2023-08-09 DIAGNOSIS — R74.8 ELEVATED LIVER ENZYMES: ICD-10-CM

## 2023-08-09 DIAGNOSIS — E78.1 HYPERTRIGLYCERIDEMIA: ICD-10-CM

## 2023-08-09 DIAGNOSIS — Z11.4 SCREENING FOR HIV (HUMAN IMMUNODEFICIENCY VIRUS): ICD-10-CM

## 2023-08-09 DIAGNOSIS — R73.03 PRE-DIABETES: ICD-10-CM

## 2023-08-09 LAB — HBA1C MFR BLD: 9.9 % (ref 0–5.6)

## 2023-08-09 PROCEDURE — 83721 ASSAY OF BLOOD LIPOPROTEIN: CPT

## 2023-08-09 PROCEDURE — 87389 HIV-1 AG W/HIV-1&-2 AB AG IA: CPT

## 2023-08-09 PROCEDURE — 80053 COMPREHEN METABOLIC PANEL: CPT

## 2023-08-09 PROCEDURE — 36415 COLL VENOUS BLD VENIPUNCTURE: CPT

## 2023-08-09 PROCEDURE — 80061 LIPID PANEL: CPT

## 2023-08-09 PROCEDURE — 83036 HEMOGLOBIN GLYCOSYLATED A1C: CPT

## 2023-08-09 NOTE — TELEPHONE ENCOUNTER
LMTCB - please relay message from Dr. ESCOBEDO upon return call.       I called [facility] in [place] - [phone]. Their systems is down. Will need to call back tomorrow.     Need to clarify which Eye doctor.  Aidan says Dr. Cardoza. I'm suspecting it's Dr. Reginaldo Mcgraw. I was going to have them look up surgery information - Surgeon and date of surgery. So we can contact their office.     I will fax information to surgery center.

## 2023-08-09 NOTE — LETTER
August 14, 2023      Vinayak Jarquin  [address] [place] [id number]        Dear ,    We are writing to inform you of your test results.    Please make an appointment with your provider to review or follow up on your test results.  Appointments can be made by calling [phone].      The lab work shows elevated liver function tests.  This can be from fatty liver and other causes.  I would advise to cut down on alcohol if he drinks regularly, avoid fatty and fried food and follow-up in 1 month's time.  Similarly your triglyceride levels are very high.  These are small fatty particles also blood.  It can also be affected by fasting.  Recommend follow-up testing in 1 month's time when fasting.  Recommend good hydration.  I would ask patient to come back for a follow-up visit to follow-up on these abnormal lab and new onset diabetes.  I will submit a referral to the diabetes educator.        Letter comments:-labs are abnormal.  Please schedule follow-up.    Resulted Orders   HIV Antigen Antibody Combo   Result Value Ref Range    HIV Antigen Antibody Combo Nonreactive Nonreactive      Comment:      HIV-1 p24 Ag & HIV-1/HIV-2 Ab Not Detected   Lipid panel reflex to direct LDL Non-fasting   Result Value Ref Range    Cholesterol 252 (H) <200 mg/dL    Triglycerides 1,048 (H) <150 mg/dL    Direct Measure HDL 33 (L) >=40 mg/dL    LDL Cholesterol Calculated        Comment:      Cannot estimate LDL when triglyceride exceeds 400 mg/dL    Non HDL Cholesterol 219 (H) <130 mg/dL    Narrative    Cholesterol  Desirable:  <200 mg/dL    Triglycerides  Normal:  Less than 150 mg/dL  Borderline High:  150-199 mg/dL  High:  200-499 mg/dL  Very High:  Greater than or equal to 500 mg/dL    Direct Measure HDL  Female:  Greater than or equal to 50 mg/dL   Male:  Greater than or equal to 40 mg/dL    LDL Cholesterol  Desirable:  <100mg/dL  Above Desirable:  100-129 mg/dL   Borderline High:  130-159 mg/dL   High:   160-189 mg/dL   Very High:  >= 190 mg/dL    Non HDL Cholesterol  Desirable:  130 mg/dL  Above Desirable:  130-159 mg/dL  Borderline High:  160-189 mg/dL  High:  190-219 mg/dL  Very High:  Greater than or equal to 220 mg/dL   Comprehensive metabolic panel (BMP + Alb, Alk Phos, ALT, AST, Total. Bili, TP)   Result Value Ref Range    Sodium 137 136 - 145 mmol/L    Potassium 4.2 3.4 - 5.3 mmol/L    Chloride 99 98 - 107 mmol/L    Carbon Dioxide (CO2) 27 22 - 29 mmol/L    Anion Gap 11 7 - 15 mmol/L    Urea Nitrogen 11.7 6.0 - 20.0 mg/dL    Creatinine 0.98 0.67 - 1.17 mg/dL    Calcium 9.5 8.6 - 10.0 mg/dL    Glucose 223 (H) 70 - 99 mg/dL    Alkaline Phosphatase 69 40 - 129 U/L    AST 60 (H) 0 - 45 U/L      Comment:      Reference intervals for this test were updated on 6/12/2023 to more accurately reflect our healthy population. There may be differences in the flagging of prior results with similar values performed with this method. Interpretation of those prior results can be made in the context of the updated reference intervals.    ALT 94 (H) 0 - 70 U/L      Comment:      Reference intervals for this test were updated on 6/12/2023 to more accurately reflect our healthy population. There may be differences in the flagging of prior results with similar values performed with this method. Interpretation of those prior results can be made in the context of the updated reference intervals.      Protein Total 7.2 6.4 - 8.3 g/dL    Albumin 4.7 3.5 - 5.2 g/dL    Bilirubin Total 1.2 <=1.2 mg/dL    GFR Estimate >90 >60 mL/min/1.73m2   Hemoglobin A1c   Result Value Ref Range    Hemoglobin A1C 9.9 (H) 0.0 - 5.6 %   LDL cholesterol direct   Result Value Ref Range    LDL Cholesterol Direct 93 <100 mg/dL      Comment:      Age 2-19 years:  Desirable: 0-110 mg/dL   Borderline high: 110-129 mg/dL   High: >= 130 mg/dL    Age 20 years and older:  Desirable: <100mg/dL  Above desirable: 100-129 mg/dL   Borderline high: 130-159 mg/dL   High:  160-189 mg/dL   Very high: >= 190 mg/dL       If you have any questions or concerns, please call the clinic at the number listed above.       Sincerely,      Florentino Agrawal MD

## 2023-08-09 NOTE — TELEPHONE ENCOUNTER
----- Message from Florentino Agrawal MD sent at 8/9/2023 12:43 PM CDT -----  Please inform patient that lab work shows uncontrolled diabetes.  He should postpone the surgery until this is addressed.  Surgery outcome is not optimal with poorly controlled diabetes.    Team-please fax this report to surgery center and leave a message for the nurse to see if the doctor would like to proceed with the eye surgery as the labs were not done at the time of visit..    Florentino Agrawal MD

## 2023-08-10 LAB
ALBUMIN SERPL BCG-MCNC: 4.7 G/DL (ref 3.5–5.2)
ALP SERPL-CCNC: 69 U/L (ref 40–129)
ALT SERPL W P-5'-P-CCNC: 94 U/L (ref 0–70)
ANION GAP SERPL CALCULATED.3IONS-SCNC: 11 MMOL/L (ref 7–15)
AST SERPL W P-5'-P-CCNC: 60 U/L (ref 0–45)
BILIRUB SERPL-MCNC: 1.2 MG/DL
BUN SERPL-MCNC: 11.7 MG/DL (ref 6–20)
CALCIUM SERPL-MCNC: 9.5 MG/DL (ref 8.6–10)
CHLORIDE SERPL-SCNC: 99 MMOL/L (ref 98–107)
CHOLEST SERPL-MCNC: 252 MG/DL
CREAT SERPL-MCNC: 0.98 MG/DL (ref 0.67–1.17)
DEPRECATED HCO3 PLAS-SCNC: 27 MMOL/L (ref 22–29)
GFR SERPL CREATININE-BSD FRML MDRD: >90 ML/MIN/1.73M2
GLUCOSE SERPL-MCNC: 223 MG/DL (ref 70–99)
HDLC SERPL-MCNC: 33 MG/DL
HIV 1+2 AB+HIV1 P24 AG SERPL QL IA: NONREACTIVE
LDLC SERPL CALC-MCNC: ABNORMAL MG/DL
LDLC SERPL DIRECT ASSAY-MCNC: 93 MG/DL
NONHDLC SERPL-MCNC: 219 MG/DL
POTASSIUM SERPL-SCNC: 4.2 MMOL/L (ref 3.4–5.3)
PROT SERPL-MCNC: 7.2 G/DL (ref 6.4–8.3)
SODIUM SERPL-SCNC: 137 MMOL/L (ref 136–145)
TRIGL SERPL-MCNC: 1048 MG/DL

## 2023-08-11 DIAGNOSIS — E78.1 HYPERTRIGLYCERIDEMIA: ICD-10-CM

## 2023-08-11 DIAGNOSIS — E11.9 NEW ONSET TYPE 2 DIABETES MELLITUS (H): Primary | ICD-10-CM

## 2023-08-11 DIAGNOSIS — R79.89 ELEVATED LFTS: ICD-10-CM

## 2023-08-14 ENCOUNTER — TELEPHONE (OUTPATIENT)
Dept: FAMILY MEDICINE | Facility: CLINIC | Age: 38
End: 2023-08-14

## 2023-08-14 NOTE — TELEPHONE ENCOUNTER
Florentino Agrawal MD  8/11/2023  7:38 AM CDT       Please inform patient that his rest of the lab work shows elevated liver function tests.  This can be from fatty liver and other causes.  I would advise to cut down on alcohol if he drinks regularly, avoid fatty and fried food and follow-up in 1 month's time.  Similarly his triglyceride levels are very high.  These are small fatty particles also blood.  It can also be affected by fasting.  Recommend follow-up testing in 1 month's time when fasting.  Recommend good hydration.  I would asked patient to come back for a follow-up visit to follow-up on these abnormal lab and new onset diabetes.  I will submit a referral to the diabetes educator.     Please mail a letter too.     Letter comments:-labs are abnormal.  Please schedule follow-up.        Florentino Agrawal MD  8/11/2023  7:49 AM CDT Back to Top      Please do call patient today and document regarding his diabetes and postponing surgery.  Please make sure that labs are faxed to surgery center.     Florentino Agrawal MD

## 2023-08-14 NOTE — TELEPHONE ENCOUNTER
Left message to call back. Please relay results and recommendations.   Lab results faxed to surgery center.   Lab letter mailed to patient also.

## 2023-08-22 NOTE — TELEPHONE ENCOUNTER
Left message to call back for: Results  Information to relay to patient: LMTCB, please see message below.

## 2023-10-15 ENCOUNTER — HEALTH MAINTENANCE LETTER (OUTPATIENT)
Age: 38
End: 2023-10-15

## 2023-11-17 ENCOUNTER — TRANSFERRED RECORDS (OUTPATIENT)
Dept: HEALTH INFORMATION MANAGEMENT | Facility: CLINIC | Age: 38
End: 2023-11-17

## 2023-12-24 ENCOUNTER — HEALTH MAINTENANCE LETTER (OUTPATIENT)
Age: 38
End: 2023-12-24

## 2024-02-09 ENCOUNTER — OFFICE VISIT (OUTPATIENT)
Dept: FAMILY MEDICINE | Facility: CLINIC | Age: 39
End: 2024-02-09
Payer: COMMERCIAL

## 2024-02-09 VITALS
DIASTOLIC BLOOD PRESSURE: 101 MMHG | HEIGHT: 71 IN | BODY MASS INDEX: 31.68 KG/M2 | WEIGHT: 226.25 LBS | HEART RATE: 66 BPM | TEMPERATURE: 97.4 F | SYSTOLIC BLOOD PRESSURE: 137 MMHG | OXYGEN SATURATION: 98 % | RESPIRATION RATE: 16 BRPM

## 2024-02-09 DIAGNOSIS — H26.9 CATARACT OF LEFT EYE, UNSPECIFIED CATARACT TYPE: ICD-10-CM

## 2024-02-09 DIAGNOSIS — Z31.69 INFERTILITY COUNSELING: ICD-10-CM

## 2024-02-09 DIAGNOSIS — E11.65 TYPE 2 DIABETES MELLITUS WITH HYPERGLYCEMIA, WITHOUT LONG-TERM CURRENT USE OF INSULIN (H): ICD-10-CM

## 2024-02-09 DIAGNOSIS — Z01.818 PREOP GENERAL PHYSICAL EXAM: Primary | ICD-10-CM

## 2024-02-09 DIAGNOSIS — R86.9 ABNORMAL SEMEN ANALYSIS: ICD-10-CM

## 2024-02-09 DIAGNOSIS — I10 BENIGN ESSENTIAL HYPERTENSION: ICD-10-CM

## 2024-02-09 DIAGNOSIS — R79.89 LOW TESTOSTERONE IN MALE: ICD-10-CM

## 2024-02-09 PROBLEM — R73.03 PRE-DIABETES: Status: RESOLVED | Noted: 2018-08-09 | Resolved: 2024-02-09

## 2024-02-09 PROBLEM — H34.8392: Status: RESOLVED | Noted: 2018-08-08 | Resolved: 2024-02-09

## 2024-02-09 PROBLEM — E11.9 DIABETES MELLITUS, TYPE 2 (H): Status: ACTIVE | Noted: 2024-02-09

## 2024-02-09 LAB
HBA1C MFR BLD: 7.6 % (ref 0–5.6)
HOLD SPECIMEN: NORMAL

## 2024-02-09 PROCEDURE — 36415 COLL VENOUS BLD VENIPUNCTURE: CPT | Performed by: FAMILY MEDICINE

## 2024-02-09 PROCEDURE — 83036 HEMOGLOBIN GLYCOSYLATED A1C: CPT | Performed by: FAMILY MEDICINE

## 2024-02-09 PROCEDURE — 99215 OFFICE O/P EST HI 40 MIN: CPT | Performed by: FAMILY MEDICINE

## 2024-02-09 NOTE — PATIENT INSTRUCTIONS
Preparing for Your Surgery  Getting started  A nurse will call you to review your health history and instructions. They will give you an arrival time based on your scheduled surgery time. Please be ready to share:  Your doctor's clinic name and phone number  Your medical, surgical, and anesthesia history  A list of allergies and sensitivities  A list of medicines, including herbal treatments and over-the-counter drugs  Whether the patient has a legal guardian (ask how to send us the papers in advance)  Please tell us if you're pregnant--or if there's any chance you might be pregnant. Some surgeries may injure a fetus (unborn baby), so they require a pregnancy test. Surgeries that are safe for a fetus don't always need a test, and you can choose whether to have one.   If you have a child who's having surgery, please ask for a copy of Preparing for Your Child's Surgery.    Preparing for surgery  Within 10 to 30 days of surgery: Have a pre-op exam (sometimes called an H&P, or History and Physical). This can be done at a clinic or pre-operative center.  If you're having a , you may not need this exam. Talk to your care team.  At your pre-op exam, talk to your care team about all medicines you take. If you need to stop any medicines before surgery, ask when to start taking them again.  We do this for your safety. Many medicines can make you bleed too much during surgery. Some change how well surgery (anesthesia) drugs work.  Call your insurance company to let them know you're having surgery. (If you don't have insurance, call [phone].)  Call your clinic if there's any change in your health. This includes signs of a cold or flu (sore throat, runny nose, cough, rash, fever). It also includes a scrape or scratch near the surgery site.  If you have questions on the day of surgery, call your hospital or surgery center.  Eating and drinking guidelines  For your safety: Unless your surgeon tells you otherwise,  follow the guidelines below.  Eat and drink as usual until 8 hours before you arrive for surgery. After that, no food or milk.  Drink clear liquids until 2 hours before you arrive. These are liquids you can see through, like water, Gatorade, and Propel Water. They also include plain black coffee and tea (no cream or milk), candy, and breath mints. You can spit out gum when you arrive.  If you drink alcohol: Stop drinking it the night before surgery.  If your care team tells you to take medicine on the morning of surgery, it's okay to take it with a sip of water.  Preventing infection  Shower or bathe the night before and morning of your surgery. Follow the instructions your clinic gave you. (If no instructions, use regular soap.)  Don't shave or clip hair near your surgery site. We'll remove the hair if needed.  Don't smoke or vape the morning of surgery. You may chew nicotine gum up to 2 hours before surgery. A nicotine patch is okay.  Note: Some surgeries require you to completely quit smoking and nicotine. Check with your surgeon.  Your care team will make every effort to keep you safe from infection. We will:  Clean our hands often with soap and water (or an alcohol-based hand rub).  Clean the skin at your surgery site with a special soap that kills germs.  Give you a special gown to keep you warm. (Cold raises the risk of infection.)  Wear special hair covers, masks, gowns and gloves during surgery.  Give antibiotic medicine, if prescribed. Not all surgeries need antibiotics.  What to bring on the day of surgery  Photo ID and insurance card  Copy of your health care directive, if you have one  Glasses and hearing aids (bring cases)  You can't wear contacts during surgery  Inhaler and eye drops, if you use them (tell us about these when you arrive)  CPAP machine or breathing device, if you use them  A few personal items, if spending the night  If you have . . .  A pacemaker, ICD (cardiac defibrillator) or other  implant: Bring the ID card.  An implanted stimulator: Bring the remote control.  A legal guardian: Bring a copy of the certified (court-stamped) guardianship papers.  Please remove any jewelry, including body piercings. Leave jewelry and other valuables at home.  If you're going home the day of surgery  You must have a responsible adult drive you home. They should stay with you overnight as well.  If you don't have someone to stay with you, and you aren't safe to go home alone, we may keep you overnight. Insurance often won't pay for this.  After surgery  If it's hard to control your pain or you need more pain medicine, please call your surgeon's office.  Questions?   If you have any questions for your care team, list them here: _________________________________________________________________________________________________________________________________________________________________________ ____________________________________ ____________________________________ ____________________________________  For informational purposes only. Not to replace the advice of your health care provider. Copyright   2003, 2019 [facility]. All rights reserved. Clinically reviewed by Faviola Pickard MD. [employer] 174874 - REV 12/22.

## 2024-02-09 NOTE — PROGRESS NOTES
Preoperative Evaluation  [facility]  [address] [facility] [address]  Phone: [phone]  Fax: [phone]  Primary Provider: Florentino Agrawal  Pre-op Performing Provider: EMIR BARRON  Feb 9, 2024       Brent is a 38 year old, presenting for the following:  Pre-Op Exam        2/9/2024     9:23 AM   Additional Questions   Roomed by Mindi TODD CMA   Accompanied by Wife     Surgical Information  Surgery/Procedure: Cataract surgery left eye  Surgery Location: [facility]  Surgeon: Dr. Mcgraw  Surgery Date: 2/26/24  Time of Surgery: 8 am  Where patient plans to recover: At home with family  Fax number for surgical facility: [phone]    Assessment & Plan     The proposed surgical procedure is considered LOW risk.    1. Preop general physical exam  2. Cataract of left eye, unspecified cataract type  Brent presents today for cataract preop.  History of left retinal detachment, resultant cataract after surgical repair of retinal detachment.     Risks and Recommendations  The patient has the following additional risks and recommendations for perioperative complications:    Diabetes:  - Patient is not on insulin therapy: regular NPO guidelines can be followed.     Antiplatelet or Anticoagulation Medication Instructions   - Patient is on no antiplatelet or anticoagulation medications.    Additional Medication Instructions  Patient is on no additional chronic medications    Recommendation  APPROVAL GIVEN to proceed with proposed procedure, without further diagnostic evaluation.    3. Type 2 diabetes mellitus with hyperglycemia, without long-term current use of insulin (H)  - Adult Diabetes Education  Referral; Future  - Adult Eye  Referral; Future  - Albumin Random Urine Quantitative with Creat Ratio    A1c 9.9 in August, recheck today 7.6 with lifestyle modifications.  Recommend meeting with diabetic educator.  I recommend initiation of metformin.   He would like to work harder on lifestyle modifications before initiation of medication.  Will give 3 to 6-month trial to clean up his diet, improve weight and exercise.    Normal diabetic foot exam today.    Will update urine microalbumin testing.    Encouraged annual diabetic eye exam.  Request results be forwarded to our clinic.    4. Benign essential hypertension  Blood pressure also elevated.  Patient reports longstanding elevated pressures over 25 years.  Will obtain more data at home.  They do have access to a home blood pressure cuff.  Schedule follow-up nurse blood pressure check visit in a couple weeks with home blood pressure readings and bring cuff to clinic.  We can confirm accuracy of cuff.  Reviewed recommendation to initiate medication management to lower blood pressure under 130/80.  Would recommend lisinopril given comorbid diabetes.    5. Infertility counseling  - Semen Analysis, Strict Morphology ([facility]); Future      Desires semen analysis, working up in fertility.  Encouraged good diabetes control as this can impact fertility.    44 minutes spent on date of encounter with chart review, patient visit, counseling, documentation.    Subjective       Via the Health Maintenance questionnaire, the patient has reported the following services have been completed -Eye Exam, this information has been sent to the abstraction team.      HPI related to upcoming procedure:     CATARACT: Left eye cataract about a year. Torn and detached retina, consequence of surgery.   Hard to see out of left eye right now. Scheduled 2/26.        DIABETES:   Hemoglobin A1C   Date Value Ref Range Status   08/09/2023 9.9 (H) 0.0 - 5.6 % Final     Has had lab checks in the past, annually at work. Surprised that his numbers would have escalated so much.     HYPERTENSION:   BP Readings from Last 6 Encounters:   02/09/24 (!) 150/110   07/31/23 (!) 141/97             2/9/2024     9:18 AM   Preop Questions   1. Have you ever had a  heart attack or stroke? No   2. Have you ever had surgery on your heart or blood vessels, such as a stent placement, a coronary artery bypass, or surgery on an artery in your head, neck, heart, or legs? No   3. Do you have chest pain with activity? No   4. Do you have a history of  heart failure? No   5. Do you currently have a cold, bronchitis or symptoms of other infection? No   6. Do you have a cough, shortness of breath, or wheezing? No   7. Do you or anyone in your family have previous history of blood clots? YES - Hx of venous retinal branch occlusion in 2018    8. Do you or does anyone in your family have a serious bleeding problem such as prolonged bleeding following surgeries or cuts? No   9. Have you ever had problems with anemia or been told to take iron pills? No   10. Have you had any abnormal blood loss such as black, tarry or bloody stools? No   11. Have you ever had a blood transfusion? No   12. Are you willing to have a blood transfusion if it is medically needed before, during, or after your surgery? Yes   13. Have you or any of your relatives ever had problems with anesthesia? No   14. Do you have sleep apnea, excessive snoring or daytime drowsiness? No   15. Do you have any artifical heart valves or other implanted medical devices like a pacemaker, defibrillator, or continuous glucose monitor? No   16. Do you have artificial joints? YES - left knee, [facility].    17. Are you allergic to latex? No       Health Care Directive  Patient does not have a Health Care Directive or Living Will: Discussed advance care planning with patient; however, patient declined at this time.    Preoperative Review of    reviewed - no record of controlled substances prescribed.      Status of Chronic Conditions:  DIABETES - Patient has a longstanding history of DiabetesType Type II . Patient is being treated with none and denies significant side effects. Control has been NO. Complicating factors include but are  "not limited to: hypertension.     HYPERTENSION - Patient has longstanding history of HTN , currently denies any symptoms referable to elevated blood pressure. Specifically denies chest pain, palpitations, dyspnea, orthopnea, PND or peripheral edema. Blood pressure readings have not been in normal range. Current medication regimen is as listed below. Patient denies any side effects of medication.     Patient Active Problem List    Diagnosis Date Noted    Diabetes mellitus, type 2 (H) 02/09/2024     Priority: Medium    Benign essential hypertension 02/09/2024     Priority: Medium    Elevated LFTs 08/09/2018     Priority: Medium    Hypertriglyceridemia 08/09/2018     Priority: Medium    Obese 08/08/2018     Priority: Medium    Exanthem      Priority: Medium     Created by Conversion          Past Medical History:   Diagnosis Date    left knee injury - torn meniscus, ligaments     Venous retinal branch occlusion (H28)      Past Surgical History:   Procedure Laterality Date    REPLACEMENT TOTAL KNEE Left 2001     No current outpatient medications on file.       No Known Allergies     Social History     Tobacco Use    Smoking status: Never     Passive exposure: Never    Smokeless tobacco: Never   Substance Use Topics    Alcohol use: Not on file     Family History   Problem Relation Age of Onset    Hypertension Father     Hyperlipidemia Father      History   Drug Use Not on file         Review of Systems    Review of Systems  Constitutional, HEENT, cardiovascular, pulmonary, GI, , musculoskeletal, neuro, skin, endocrine and psych systems are negative, except as otherwise noted.    Objective    BP (!) 150/110 (BP Location: Left arm, Patient Position: Sitting, Cuff Size: Adult Regular)   Pulse 72   Temp 97.4  F (36.3  C) (Oral)   Resp 16   Ht 1.803 m (5' 11\")   Wt 102.6 kg (226 lb 4 oz)   SpO2 98%   BMI 31.56 kg/m     Estimated body mass index is 31.56 kg/m  as calculated from the following:    Height as of this " "encounter: 1.803 m (5' 11\").    Weight as of this encounter: 102.6 kg (226 lb 4 oz).  Physical Exam    GENERAL: alert and no distress  NECK: no adenopathy, no asymmetry, masses, or scars  RESP: lungs clear to auscultation - no rales, rhonchi or wheezes  CV: regular rate and rhythm, normal S1 S2, no S3 or S4, no murmur, click or rub, no peripheral edema  ABDOMEN: soft, nontender, no hepatosplenomegaly, no masses and bowel sounds normal  MS: no gross musculoskeletal defects noted, no edema      Diabetic foot exam: normal DP and PT pulses, no trophic changes or ulcerative lesions, and normal monofilament exam      Recent Labs   Lab Test 08/09/23  0814      POTASSIUM 4.2   CR 0.98   A1C 9.9*        Diagnostics  Recent Results (from the past 24 hour(s))   HEMOGLOBIN A1C    Collection Time: 02/09/24  9:49 AM   Result Value Ref Range    Hemoglobin A1C 7.6 (H) 0.0 - 5.6 %        No EKG required for low risk surgery (cataract, skin procedure, breast biopsy, etc).    Revised Cardiac Risk Index (RCRI)  The patient has the following serious cardiovascular risks for perioperative complications:   - No serious cardiac risks = 0 points     RCRI Interpretation: 0 points: Class I (very low risk - 0.4% complication rate)         Signed Electronically by: Maul Pearson DO  Copy of this evaluation report is provided to requesting physician.         "

## 2024-02-09 NOTE — RESULT ENCOUNTER NOTE
Patient updated by [employer] message with lab results.      As reviewed at appointment, A1c improved from last check 6 months ago but still above goal.  Meet with Yue the diabetic educator.  Keep working on healthy lifestyle modifications.  If A1c remains above 6.5 in the next 3 to 6 months, we will discuss initiation of medication.  Malu Pearson, DO

## 2024-02-12 ENCOUNTER — APPOINTMENT (OUTPATIENT)
Dept: LAB | Facility: CLINIC | Age: 39
End: 2024-02-12
Payer: COMMERCIAL

## 2024-02-12 LAB
ABNORMAL SPERM MORPHOLOGY: 100
ABSTINENCE DAYS: 4 DAYS (ref 2–7)
AGGLUTINATION: NO
ANALYSIS TEMP - CENTIGRADE: 21 CENTIGRADE
COLLECTION METHOD: ABNORMAL
COLLECTION SITE: ABNORMAL
CONSENT TO RELEASE TO PARTNER: YES
DAL- RECEIVED TIME: ABNORMAL
HEAD DEFECT: 100 %
IMMOTILE: 27 %
LIQUEFIED: YES
MIDPIECE DEFECT: 54 %
NON-PROGRESSIVE MOTILITY: 4 %
NORMAL SPERM MORPHOLOGY: 0 % NORMAL FORMS
PROGRESSIVE MOTILITY: 69 %
ROUND CELLS: 0.7 MILLION/ML
SPECIMEN PH: 7.2 PH
SPECIMEN VOLUME: 2.5 ML
SPERM CONCENTRATION: 56.8 MILLION/ML
TAIL DEFECT: 9 %
TIME OF ANALYSIS: ABNORMAL
TOTAL PROGRESSIVE MOTILE NUMBER: 98 MILLION
TOTAL SPERM NUMBER: 142 MILLION
VISCOUS: NO
VITALITY: ABNORMAL

## 2024-02-12 PROCEDURE — 89322 SEMEN ANAL STRICT CRITERIA: CPT | Performed by: FAMILY MEDICINE

## 2024-02-13 NOTE — RESULT ENCOUNTER NOTE
Patient updated by [employer] message.  Future orders placed for additional workup.      Lidia Guillermoe,  Thank you for completing the semen analysis sample.  It does show abnormal morphology or shape of the sperm.  I would recommend we recheck the semen analysis for confirmation.  I will order a subsequent order.  I would also recommend we check a couple other labs including testosterone level, LH, FSH.  Pending results, we will discuss referral to urology.  I would schedule the next lab visit for an 8 AM draw so we can get a more accurate testosterone level.  They can provide the second semen analysis sample at that time.  Malu Pearson, DO

## 2024-02-13 NOTE — PROGRESS NOTES
6. Abnormal semen analysis  - Semen Analysis, Strict Morphology (KISHORE); Future  - Testosterone, total; Future  - Follicle stimulating hormone; Future  - Luteinizing Hormone; Future     Malu Pearson DO

## 2024-02-23 ENCOUNTER — ALLIED HEALTH/NURSE VISIT (OUTPATIENT)
Dept: FAMILY MEDICINE | Facility: CLINIC | Age: 39
End: 2024-02-23
Payer: COMMERCIAL

## 2024-02-23 ENCOUNTER — MEDICAL CORRESPONDENCE (OUTPATIENT)
Dept: HEALTH INFORMATION MANAGEMENT | Facility: CLINIC | Age: 39
End: 2024-02-23

## 2024-02-23 ENCOUNTER — LAB (OUTPATIENT)
Dept: LAB | Facility: CLINIC | Age: 39
End: 2024-02-23
Payer: COMMERCIAL

## 2024-02-23 VITALS — HEART RATE: 77 BPM | SYSTOLIC BLOOD PRESSURE: 124 MMHG | DIASTOLIC BLOOD PRESSURE: 87 MMHG

## 2024-02-23 DIAGNOSIS — E11.9 DIABETES MELLITUS, TYPE 2 (H): Primary | ICD-10-CM

## 2024-02-23 DIAGNOSIS — R86.9 ABNORMAL SEMEN ANALYSIS: ICD-10-CM

## 2024-02-23 DIAGNOSIS — I10 BENIGN ESSENTIAL HYPERTENSION: Primary | ICD-10-CM

## 2024-02-23 LAB
FSH SERPL IRP2-ACNC: 2.1 MIU/ML (ref 1.5–12.4)
LH SERPL-ACNC: 1.9 MIU/ML (ref 1.7–8.6)

## 2024-02-23 PROCEDURE — 83002 ASSAY OF GONADOTROPIN (LH): CPT

## 2024-02-23 PROCEDURE — 84403 ASSAY OF TOTAL TESTOSTERONE: CPT

## 2024-02-23 PROCEDURE — 36415 COLL VENOUS BLD VENIPUNCTURE: CPT

## 2024-02-23 PROCEDURE — 83001 ASSAY OF GONADOTROPIN (FSH): CPT

## 2024-02-23 PROCEDURE — 99207 PR NO CHARGE NURSE ONLY: CPT

## 2024-02-23 NOTE — PROGRESS NOTES
Follow Up Blood Pressure Check    Vinayak Jarquin is a 38 year old male recommended to follow up for blood pressure check by Florentino Agrawal. Anihypertensive medications and adherence were verified: Yes.     Reason for visit: Elevated Blood pressure     Medication change at last visit: none     Today's Vitals:   Vitals:    02/23/24 0751 02/23/24 0811   BP: (!) 141/92 124/87   BP Location: Right arm    Patient Position: Sitting    Cuff Size: Adult Regular    Pulse: 82 77       Home blood pressure readings brought in today:   2//83 hr 83, 2/13- 141/87, hr 83, 2//80, hr 87, 2/15/-120/77 hr 72, 2//82, hr 72, 2//72 hr 86, 2//90, hr 78    Lowest blood pressure today is  124/87  and they deny signs or symptoms of new onset: severe headache, fatigue, confusion, vision changes, chest pain, pounding in the chest, neck, ears, irregular heartbeat, difficulty breathing, and blood in the urine.  Please inform patient of his/her blood pressure today.  If they are asymptomatic, the patient is to continue current medications.  This message will be routed to their provider, and they will be notified if a change in medication is recommended.      Neeru Brownlee MA    No current outpatient medications on file.

## 2024-02-23 NOTE — PROGRESS NOTES
Patient updated by [employer] message.    Brent,  Thanks for checking your blood pressures at home and coming in to recheck.  Home readings overall acceptable.  We will hold off on initiation of medications.  Please continue working on healthy habits.    Reach out with any follow-up questions or concerns.    Malu Pearson, DO

## 2024-02-27 ENCOUNTER — LAB (OUTPATIENT)
Dept: LAB | Facility: CLINIC | Age: 39
End: 2024-02-27
Payer: COMMERCIAL

## 2024-02-27 DIAGNOSIS — R86.9 ABNORMAL SEMEN ANALYSIS: ICD-10-CM

## 2024-02-27 LAB — TESTOST SERPL-MCNC: 194 NG/DL (ref 240–950)

## 2024-02-27 PROCEDURE — 89322 SEMEN ANAL STRICT CRITERIA: CPT

## 2024-02-27 NOTE — RESULT ENCOUNTER NOTE
Patient updated by [employer] message with lab results.       Lidia Kenneyd,  Testosterone level was low. LH and FSH look okay. I will place referral to urology.  Malu Pearson, DO

## 2024-02-28 LAB
ABNORMAL SPERM MORPHOLOGY: 100
ABSTINENCE DAYS: 3 DAYS (ref 2–7)
AGGLUTINATION: NO
ANALYSIS TEMP - CENTIGRADE: 22 CENTIGRADE
COLLECTION METHOD: ABNORMAL
COLLECTION SITE: ABNORMAL
CONSENT TO RELEASE TO PARTNER: YES
DAL- RECEIVED TIME: ABNORMAL
HEAD DEFECT: 100 %
IMMOTILE: 31 %
LIQUEFIED: YES
MIDPIECE DEFECT: 67 %
NON-PROGRESSIVE MOTILITY: 14 %
NORMAL SPERM MORPHOLOGY: 0 % NORMAL FORMS
PROGRESSIVE MOTILITY: 55 %
ROUND CELLS: 0.9 MILLION/ML
SPECIMEN PH: 7.6 PH
SPECIMEN VOLUME: 2.3 ML
SPERM CONCENTRATION: 56 MILLION/ML
TAIL DEFECT: 12 %
TIME OF ANALYSIS: ABNORMAL
TOTAL PROGRESSIVE MOTILE NUMBER: 71 MILLION
TOTAL SPERM NUMBER: 129 MILLION
VISCOUS: NO
VITALITY: ABNORMAL

## 2024-02-28 NOTE — TELEPHONE ENCOUNTER
MEDICAL RECORDS REQUEST   [facility] Clinic  [address] [place] [address]  PHONE: [phone]  Fax: [phone]        FUTURE VISIT INFORMATION                                                   Vinayak Jarquin YOB: 1985 scheduled for future visit at [facility]    APPOINTMENT INFORMATION:  Date: 2024  Provider:  Javi Watson MD  Reason for Visit/Diagnosis: Abnormal semen analysis    REFERRAL INFORMATION:  Referring provider:  Malu Pearson DO in [facility]      RECORDS REQUESTED FOR VISIT                                                     NOTES  STATUS/DETAILS   OFFICE NOTE from referring provider  yes, 2024 -- Malu Pearson DO in [facility]   MEDICATION LIST  yes   INFERTILITY     SEMEN ANALYSIS (LAST 2)  yes, 2024, 2024   FSH  yes   LH  yes   T  yes     PRE-VISIT CHECKLIST      Joint diagnostic appointment coordinated correctly          (ensure right order & amount of time) Yes   RECORD COLLECTION COMPLETE Yes

## 2024-02-28 NOTE — RESULT ENCOUNTER NOTE
Patient updated by [employer] results message with the lab results.      Thanks for dropping off the repeat sample.  It is consistent with the previous sample, abnormal sperm morphology.  See urology as referred.  Malu Pearson, DO

## 2024-02-29 ENCOUNTER — PRE VISIT (OUTPATIENT)
Dept: UROLOGY | Facility: CLINIC | Age: 39
End: 2024-02-29
Payer: COMMERCIAL

## 2024-02-29 NOTE — CONFIDENTIAL NOTE
Reason for Visit: consult to discuss fertility    Orders/Procedures/Records:  in system    Rooming Requirements: normal      Montse Figueroa  02/29/24  7:17 AM

## 2024-03-07 ENCOUNTER — PRE VISIT (OUTPATIENT)
Dept: UROLOGY | Facility: CLINIC | Age: 39
End: 2024-03-07

## 2024-03-07 ENCOUNTER — LAB (OUTPATIENT)
Dept: LAB | Facility: CLINIC | Age: 39
End: 2024-03-07
Payer: COMMERCIAL

## 2024-03-07 ENCOUNTER — OFFICE VISIT (OUTPATIENT)
Dept: UROLOGY | Facility: CLINIC | Age: 39
End: 2024-03-07
Attending: UROLOGY
Payer: COMMERCIAL

## 2024-03-07 VITALS
DIASTOLIC BLOOD PRESSURE: 97 MMHG | WEIGHT: 218 LBS | SYSTOLIC BLOOD PRESSURE: 144 MMHG | BODY MASS INDEX: 30.52 KG/M2 | HEIGHT: 71 IN | HEART RATE: 80 BPM | OXYGEN SATURATION: 97 %

## 2024-03-07 DIAGNOSIS — R79.89 LOW TESTOSTERONE IN MALE: ICD-10-CM

## 2024-03-07 DIAGNOSIS — E11.65 TYPE II OR UNSPECIFIED TYPE DIABETES MELLITUS WITH RENAL MANIFESTATIONS, UNCONTROLLED(250.42) (H): Primary | ICD-10-CM

## 2024-03-07 DIAGNOSIS — R86.9 ABNORMAL SEMEN ANALYSIS: ICD-10-CM

## 2024-03-07 DIAGNOSIS — E11.29 TYPE II OR UNSPECIFIED TYPE DIABETES MELLITUS WITH RENAL MANIFESTATIONS, UNCONTROLLED(250.42) (H): Primary | ICD-10-CM

## 2024-03-07 LAB
CREAT UR-MCNC: 239 MG/DL
FERRITIN SERPL-MCNC: 327 NG/ML (ref 31–409)
LH SERPL-ACNC: 2 MIU/ML (ref 1.7–8.6)
MICROALBUMIN UR-MCNC: <12 MG/L
MICROALBUMIN/CREAT UR: NORMAL MG/G{CREAT}
PROLACTIN SERPL 3RD IS-MCNC: 4 NG/ML (ref 4–15)
SHBG SERPL-SCNC: 9 NMOL/L (ref 11–80)

## 2024-03-07 PROCEDURE — 99204 OFFICE O/P NEW MOD 45 MIN: CPT | Performed by: UROLOGY

## 2024-03-07 PROCEDURE — 36415 COLL VENOUS BLD VENIPUNCTURE: CPT | Performed by: PATHOLOGY

## 2024-03-07 PROCEDURE — 84270 ASSAY OF SEX HORMONE GLOBUL: CPT | Performed by: UROLOGY

## 2024-03-07 PROCEDURE — 82570 ASSAY OF URINE CREATININE: CPT | Performed by: FAMILY MEDICINE

## 2024-03-07 PROCEDURE — 99000 SPECIMEN HANDLING OFFICE-LAB: CPT | Performed by: PATHOLOGY

## 2024-03-07 PROCEDURE — 84146 ASSAY OF PROLACTIN: CPT | Performed by: UROLOGY

## 2024-03-07 PROCEDURE — 84403 ASSAY OF TOTAL TESTOSTERONE: CPT | Performed by: UROLOGY

## 2024-03-07 PROCEDURE — 83002 ASSAY OF GONADOTROPIN (LH): CPT | Performed by: UROLOGY

## 2024-03-07 PROCEDURE — 82728 ASSAY OF FERRITIN: CPT | Performed by: PATHOLOGY

## 2024-03-07 RX ORDER — BROMFENAC 1.03 MG/ML
3 SOLUTION/ DROPS OPHTHALMIC DAILY
COMMUNITY
Start: 2024-02-27

## 2024-03-07 ASSESSMENT — PAIN SCALES - GENERAL: PAINLEVEL: NO PAIN (0)

## 2024-03-07 NOTE — PROGRESS NOTES
Dear Dr. Malu Pearson, it was my pleasure to see Mr. Vinayak Jarquin, a 38 year old male here in consultation today for fertility evaluation.  His spouse is Renee Jarquin  age 33 (11/12/90).    This couple has been attempting to conceive for the last year plus, maybe 18 mos. They have no previous pregnancy together.  Pregnancies with other partners: none.  They have tried timed intercourse. They have not tried IUI or IVF.    Female factors suspected: None known.  Cycles are regular. Labs normal, HSG all within normal limits.    PAST MEDICAL HISTORY:    Past Medical History:   Diagnosis Date     left knee injury - torn meniscus, ligaments      Venous retinal branch occlusion (H28)      PAST SURG HISTORY  Past Surgical History:   Procedure Laterality Date     REPLACEMENT TOTAL KNEE Left 2001    Eye surgery - cataract recently.  Retina surgery year ago.      Medications as of 3/7/2024:  Current Outpatient Medications   Medication Sig     bromfenac (BROMDAY) 0.09 % ophthalmic solution Place 3 drops into both eyes daily Had cataract surgery last Monday, tapers down his dosage per eye alternating per day     No current facility-administered medications for this visit.        ALLERGY:   No Known Allergies    SOCIAL HISTORY:  . Occupation: engineering consulting.  No alcohol abuse, no tobacco use.   Social History     Tobacco Use     Smoking status: Never     Passive exposure: Never     Smokeless tobacco: Never   Vaping Use     Vaping Use: Never used   Alcohol is 5/week      FAMILY HISTORY:   Family History   Problem Relation Age of Onset     Hypertension Father      Hyperlipidemia Father        REVIEW OF SYSTEMS:  Significant for feeling well at present .    Denies erectile dysfunction, ejaculatory problems, testicular pain. No vision or smell deficits, no chronic sinus or respiratory infections. No recent febrile illness, weight loss. No heat or cold intolerance, gynecomastia, or other endocrine  "complaints.    Otherwise, no constitutional, eye, ENT, heart, lung, GI , musculoskeletal, skin, neurologic, psychiatric, or hematologic complaints.        GONADOTOXIN EXPOSURE: Unremarkable. Otherwise negative for marijuana, heat, chemicals, pesticides, heavy metals, steroids, chemotherapy or radiation.    GENERAL PHYSICAL EXAM  BP (!) 144/97   Pulse 80   Ht 1.803 m (5' 11\")   Wt 98.9 kg (218 lb)   SpO2 97%   BMI 30.40 kg/m     Constitutional: No acute distress. Well nourished.   PSYCH: normal mood and affect.  NEURO: normal gait, no focal deficits.   EYES: anicteric, EOMI, PERR  CARDIOPULMONARY: breathing non-labored, pulse regular, no peripheral edema.  GI: Abdomen soft, non-tender, no  surgical scars, no organomegaly.  MUSCULOSKELETAL: normal limb proportions, no muscle wasting, no contractures.  SKIN: Normal virilized hair distribution, no lesions, warts or rashes over genitalia, abdomen extremities or face.  HEME/LYMPH: no ecchymosis, no lymphadenopathy in groin, no lymphedema.     EXAM:  Phallus circumcised, meatus adequate, no plaques palpated.   Left testis descended , size is 25 cc , consistency is normal . No intra-testicular masses.   Right testis descended , size is 25 cc , consistency is normal . No intra-testicular masses.   Epididymes present, non-tender, not enlarged.   Left cord: Vas present. No varicocele noted.  Right cord: Vas present. No varicocele noted.     Rectal exam deferred.     Labs/imaging reviewed by me today:    Component      Latest Ref Rng 2/9/2024  9:49 AM 2/12/2024  6:42 AM 2/23/2024  7:52 AM 2/27/2024  12:41 PM   Collection Method  Masturbation   Masturbation    Collection Site  KISHORE   KISHORE    Time of Receipt  6:45 AM   12:44 PM    Time of Analysis  7:05 AM   12:56 PM    Analysis Temp - Centigrade      centigrade  21.0   22.0    Abstinence days      2 - 7 days  4   3    Liquefied      Yes   Yes   Yes    Viscous      No   No   No    Agglutination      No   No   No    pH      " >=7.2 pH  7.2   7.6    Volume      >=1.4 mL  2.5   2.3    Concentration      >=16.0 million/ml  56.8   56.0    Total Number      >=39.0 million  142.0   129.0    Progressive motility      >=30 %  69   55    Non-progressive motility      %  4   14    Immotile      %  27   31    Total Progressive Motile      million  98.00   71.00    Vitality  --   --    Normal Sperm      >=4 % Normal forms  0 (L)   0 (L)    Abnormal Sperm  100   100    Head Defect      %  100   100    Midpiece Defect      %  54   67    Tail Defect      %  9   12    Round Cells      <=2.0 million/ml  0.7   0.9    Consent to Release to Partner  Yes   Yes    Hemoglobin A1C      0.0 - 5.6 % 7.6 (H)       Testosterone Total      240 - 950 ng/dL   194 (L)     FSH      1.5 - 12.4 mIU/mL   2.1     Luteinizing Hormone      1.7 - 8.6 mIU/mL   1.9            ASSESSMENT:    Fertility Testing.    Testicular hypofunction - isolated teratospermia    Hypogonadism, unclear if primary or secondary.    No varicocele noted    Elevated A1c, DM II diagnosis.  He prefers diet/exercise.  Discussed I would have low threshold to have his PCP manage this with medication if number not normalized in 6 months with diet/exercise.      PLAN:    Hormonal panel and semen analyses reviewed.    Recheck T, LH, PRL, ferritin today.    Assisted reproductive technology options discussed, IUI/IVF.    Discussed DM management may help fertility.    Discussed OTC supplement to consider taking    Discussed teratospermia- discussed this may or may not portend decreased fertility chances.    I will contact him with results and plan/options.      Thank-you for allowing me to care for your patient.  Sincerely,    Javi Watson MD      CC: Malu Pearson         Additional Coding Information:    Problems:  4 -- two or more stable chronic illnesses    Data Reviewed  3 or more studies reviewed, as listed above     Tests ordered/pendin labs ordered     Level of risk:  3 -- low risk (e.g., OTC  medication or observation, minor surgery without risks)    Time spent:  35 minutes spent on the date of the encounter doing chart review, history and exam, documentation and further activities per the note

## 2024-03-07 NOTE — LETTER
3/7/2024       RE: Vinayak Jarquin  [address] [place] [id number]     Dear Colleague,    Thank you for referring your patient, Vinayak Jarquin, to the [facility] [place] at [facility]. Please see a copy of my visit note below.    Dear Dr. Malu Pearson, it was my pleasure to see . Vinayak Jarquin, a 38 year old male here in consultation today for fertility evaluation.  His spouse is Renee Jarquin  age 33 (11/12/90).    This couple has been attempting to conceive for the last year plus, maybe 18 mos. They have no previous pregnancy together.  Pregnancies with other partners: none.  They have tried timed intercourse. They have not tried IUI or IVF.    Female factors suspected: None known.  Cycles are regular. Labs normal, HSG all within normal limits.    PAST MEDICAL HISTORY:    Past Medical History:   Diagnosis Date    left knee injury - torn meniscus, ligaments     Venous retinal branch occlusion (H28)      PAST SURG HISTORY  Past Surgical History:   Procedure Laterality Date    REPLACEMENT TOTAL KNEE Left 2001    Eye surgery - cataract recently.  Retina surgery year ago.      Medications as of 3/7/2024:  Current Outpatient Medications   Medication Sig    bromfenac (BROMDAY) 0.09 % ophthalmic solution Place 3 drops into both eyes daily Had cataract surgery last Monday, tapers down his dosage per eye alternating per day     No current facility-administered medications for this visit.        ALLERGY:   No Known Allergies    SOCIAL HISTORY:  . Occupation: engineering consulting.  No alcohol abuse, no tobacco use.   Social History     Tobacco Use    Smoking status: Never     Passive exposure: Never    Smokeless tobacco: Never   Vaping Use    Vaping Use: Never used   Alcohol is 5/week      FAMILY HISTORY:   Family History   Problem Relation Age of Onset    Hypertension Father     Hyperlipidemia Father        REVIEW OF  "SYSTEMS:  Significant for feeling well at present .    Denies erectile dysfunction, ejaculatory problems, testicular pain. No vision or smell deficits, no chronic sinus or respiratory infections. No recent febrile illness, weight loss. No heat or cold intolerance, gynecomastia, or other endocrine complaints.    Otherwise, no constitutional, eye, ENT, heart, lung, GI , musculoskeletal, skin, neurologic, psychiatric, or hematologic complaints.        GONADOTOXIN EXPOSURE: Unremarkable. Otherwise negative for marijuana, heat, chemicals, pesticides, heavy metals, steroids, chemotherapy or radiation.    GENERAL PHYSICAL EXAM  BP (!) 144/97   Pulse 80   Ht 1.803 m (5' 11\")   Wt 98.9 kg (218 lb)   SpO2 97%   BMI 30.40 kg/m     Constitutional: No acute distress. Well nourished.   PSYCH: normal mood and affect.  NEURO: normal gait, no focal deficits.   EYES: anicteric, EOMI, PERR  CARDIOPULMONARY: breathing non-labored, pulse regular, no peripheral edema.  GI: Abdomen soft, non-tender, no  surgical scars, no organomegaly.  MUSCULOSKELETAL: normal limb proportions, no muscle wasting, no contractures.  SKIN: Normal virilized hair distribution, no lesions, warts or rashes over genitalia, abdomen extremities or face.  HEME/LYMPH: no ecchymosis, no lymphadenopathy in groin, no lymphedema.     EXAM:  Phallus circumcised, meatus adequate, no plaques palpated.   Left testis descended , size is 25 cc , consistency is normal . No intra-testicular masses.   Right testis descended , size is 25 cc , consistency is normal . No intra-testicular masses.   Epididymes present, non-tender, not enlarged.   Left cord: Vas present. No varicocele noted.  Right cord: Vas present. No varicocele noted.     Rectal exam deferred.     Labs/imaging reviewed by me today:    Component      Latest Ref Rng 2/9/2024  9:49 AM 2/12/2024  6:42 AM 2/23/2024  7:52 AM 2/27/2024  12:41 PM   Collection Method  Masturbation   Masturbation    Collection Site  " KISHORE   KISHORE    Time of Receipt  6:45 AM   12:44 PM    Time of Analysis  7:05 AM   12:56 PM    Analysis Temp - Centigrade      centigrade  21.0   22.0    Abstinence days      2 - 7 days  4   3    Liquefied      Yes   Yes   Yes    Viscous      No   No   No    Agglutination      No   No   No    pH      >=7.2 pH  7.2   7.6    Volume      >=1.4 mL  2.5   2.3    Concentration      >=16.0 million/ml  56.8   56.0    Total Number      >=39.0 million  142.0   129.0    Progressive motility      >=30 %  69   55    Non-progressive motility      %  4   14    Immotile      %  27   31    Total Progressive Motile      million  98.00   71.00    Vitality  --   --    Normal Sperm      >=4 % Normal forms  0 (L)   0 (L)    Abnormal Sperm  100   100    Head Defect      %  100   100    Midpiece Defect      %  54   67    Tail Defect      %  9   12    Round Cells      <=2.0 million/ml  0.7   0.9    Consent to Release to Partner  Yes   Yes    Hemoglobin A1C      0.0 - 5.6 % 7.6 (H)       Testosterone Total      240 - 950 ng/dL   194 (L)     FSH      1.5 - 12.4 mIU/mL   2.1     Luteinizing Hormone      1.7 - 8.6 mIU/mL   1.9            ASSESSMENT:  Fertility Testing.  Testicular hypofunction - isolated teratospermia  Hypogonadism, unclear if primary or secondary.  No varicocele noted  Elevated A1c, DM II diagnosis.  He prefers diet/exercise.  Discussed I would have low threshold to have his PCP manage this with medication if number not normalized in 6 months with diet/exercise.      PLAN:  Hormonal panel and semen analyses reviewed.  Recheck T, LH, PRL, ferritin today.  Assisted reproductive technology options discussed, IUI/IVF.  Discussed DM management may help fertility.  Discussed OTC supplement to consider taking  Discussed teratospermia- discussed this may or may not portend decreased fertility chances.  I will contact him with results and plan/options.      Thank-you for allowing me to care for your patient.  Sincerely,    Javi Watson,  MD      CC: Malu Pearson         Additional Coding Information:    Problems:  4 -- two or more stable chronic illnesses    Data Reviewed  3 or more studies reviewed, as listed above     Tests ordered/pendin labs ordered     Level of risk:  3 -- low risk (e.g., OTC medication or observation, minor surgery without risks)    Time spent:  35 minutes spent on the date of the encounter doing chart review, history and exam, documentation and further activities per the note

## 2024-03-07 NOTE — PATIENT INSTRUCTIONS
Consent to communicate on file with Tawnya.    Writer called Tawnya and reviewed message per Dr. Alfaro.    Tawnya verbalized understanding and in agreement with plan.    Follow up lab appt scheduled on 1/17/19.  Appt date, time and location confirmed with Tawnya.    Writer informed Tawnya writer would also notify patient of message.  Tawnya asked writer to make sure writer informs patient writer already spoke with Tawnya.    Writer called patient, reviewed message from Dr. Alfaro, lab appointment information, and that writer did call Tawnya with Dr. Alfaro's message.    Patient verbalized understanding and in agreement with plan.  DM Morgan, ALANN, RN           You had blood work today.  Dr. Watson will contact you on [employer] [facility]t with the results.    It was a pleasure meeting with you today.  Thank you for allowing me and my team the privilege of caring for you today.  YOU are the reason we are here, and I truly hope we provided you with the excellent service you deserve.  Please let us know if there is anything else we can do for you so that we can be sure you are leaving completely satisfied with your care experience.

## 2024-03-07 NOTE — NURSING NOTE
"Chief Complaint   Patient presents with    Consult     My balls dont work       Blood pressure (!) 144/97, pulse 80, height 1.803 m (5' 11\"), weight 98.9 kg (218 lb), SpO2 97%. Body mass index is 30.4 kg/m .    Patient Active Problem List   Diagnosis    Exanthem    Obese    Elevated LFTs    Hypertriglyceridemia    Diabetes mellitus, type 2 (H)    Benign essential hypertension       No Known Allergies    Current Outpatient Medications   Medication Sig Dispense Refill    bromfenac (BROMDAY) 0.09 % ophthalmic solution Place 3 drops into both eyes daily Had cataract surgery last Monday, tapers down his dosage per eye alternating per day         Social History     Tobacco Use    Smoking status: Never     Passive exposure: Never    Smokeless tobacco: Never   Vaping Use    Vaping Use: Never used       Brian Newman  3/7/2024  8:38 AM     "

## 2024-03-08 NOTE — RESULT ENCOUNTER NOTE
Patient updated by [employer] message with lab results.      Lidia Kennedy,  Thank you for dropping off a urine sample, it has returned negative for protein.  We checked this for all patients with diabetes as diabetes and high blood pressure can damage the kidneys.  We need to keep working towards good blood sugar and blood pressure control to help continue to protect your kidney function.  Please reach out with any follow-up questions or concerns.  Malu Pearson, DO

## 2024-03-09 ENCOUNTER — MYC MEDICAL ADVICE (OUTPATIENT)
Dept: FAMILY MEDICINE | Facility: CLINIC | Age: 39
End: 2024-03-09
Payer: COMMERCIAL

## 2024-03-09 DIAGNOSIS — E11.65 TYPE 2 DIABETES MELLITUS WITH HYPERGLYCEMIA, WITHOUT LONG-TERM CURRENT USE OF INSULIN (H): Primary | ICD-10-CM

## 2024-03-10 LAB
TESTOST FREE SERPL-MCNC: 8.95 NG/DL
TESTOST SERPL-MCNC: 271 NG/DL (ref 240–950)

## 2024-03-11 NOTE — TELEPHONE ENCOUNTER
1. Type 2 diabetes mellitus with hyperglycemia, without long-term current use of insulin (H)  - metFORMIN (GLUCOPHAGE) 500 MG tablet; Take 1 tablet (500 mg) by mouth daily (with dinner)  Dispense: 90 tablet; Refill: 3     Malu Pearson DO

## 2024-03-12 NOTE — RESULT ENCOUNTER NOTE
"Dear Brent,     Here are your recent results.   Repeat testosterone level is within normal limits.  Free ( active form) testosterone level is looking great.  Your SHBG protein is lower, this just makes the total testosterone look low.  SHBG binds and inactivates testosterone hormone, but the bound form adds to the total testosterone level on the lab measurement.    Lutropin, prolactin and ferritin are proteins/hormones that help regulate testosterone level, these are all normal.     An option from the male side is to use a prescription medication like Clomid to fine tune your hormones some, and hopefully improve sperm production.  Clomid is marketed as a female fertility medication but is used commonly \"off-label\" for treating men as well.  Clomid helps the body increase stimulation to the testicle to try to improve sperm production.  Results are variable (sometimes doesn't work at all) and results take at least 3-4 months on the medication to see possible improvement in semen analysis parameters, due to the slow rate of sperm production.  Side effects are uncommon but can include decreased libido, breast tenderness, or water weight gain.  Let me know if you are interested in trying this.  I don't expect that taking Clomid would improve the sperm morphology ( shape).      Please let us know if you have any questions or concerns.     Antolin ECKERT "

## 2024-03-19 ENCOUNTER — ALLIED HEALTH/NURSE VISIT (OUTPATIENT)
Dept: EDUCATION SERVICES | Facility: CLINIC | Age: 39
End: 2024-03-19
Payer: COMMERCIAL

## 2024-03-19 VITALS — WEIGHT: 232.2 LBS | BODY MASS INDEX: 32.39 KG/M2

## 2024-03-19 DIAGNOSIS — E11.65 TYPE 2 DIABETES MELLITUS WITH HYPERGLYCEMIA, WITHOUT LONG-TERM CURRENT USE OF INSULIN (H): ICD-10-CM

## 2024-03-19 PROCEDURE — G0108 DIAB MANAGE TRN  PER INDIV: HCPCS

## 2024-03-19 RX ORDER — BLOOD SUGAR DIAGNOSTIC
STRIP MISCELLANEOUS
Qty: 100 STRIP | Refills: 5 | Status: SHIPPED | OUTPATIENT
Start: 2024-03-19

## 2024-03-19 RX ORDER — LANCETS
EACH MISCELLANEOUS
Qty: 100 EACH | Refills: 4 | Status: SHIPPED | OUTPATIENT
Start: 2024-03-19

## 2024-03-19 NOTE — PATIENT INSTRUCTIONS
1. Eat 3 balanced meals each day - Monitor carb intake and limit to 60-75 grams per meal  This would be equal to 4-5 choices ~  1 choice = 15 grams    Do not wait longer than 4-5 hours to eat something  Snacks limit to no more than 30 grams of carbohydrates or 2 choices  Make sure you include protein source with each meal and at bedtime - this has been shown to help with blood glucose elevations    2. Check blood sugars 2  times each day  - when you wake up before eating anything  - 2 hours AFTER dinner    Fasting and before meal target is 80 - 130   2 hours after a meal target is < 180  remember to bring meter and log book to all appointments    3. Incorporate 30 minutes activity into each day - does not need to be all at one time & walking counts    4. Take diabetes medications as prescribed Metformin 500 mg daily , if your morning blood glucose numbers are over 130 more than they are under 130 , increase Metformin to 2 tablet with dinner      Thank you for coming in to see me today !      I value your experience and would be very thankful for your time in providing feedback in our clinic survey.    You may receive an e-mail, text message or even something in the mail from [employer].  This is a survey to let us know how we are doing - the survey will be related to your diabetes education and me.  
5

## 2024-03-19 NOTE — LETTER
3/19/2024         RE: Vinayak Jarquin  [address] [place] [id number]        Dear Colleague,    Thank you for referring your patient, Vinayak Jarquin, to the [facility]. Please see a copy of my visit note below.    Diabetes Self-Management Education & Support    Presents for: Initial Assessment for new diagnosis    Type of Service: In Person Visit      ASSESSMENT:  Brent is a pleasant 38-year-old who comes to clinic today to receive initial education for new diagnosis of DMT2.  He arrived today accompanied by his wife Renee.  Medications were reviewed and he confirms he is currently taking 500 mg of metformin p.o. daily, with no missed or skipped doses and is tolerating well.  Brent was very receptive to all the information/education that was provided to him today.  Brent is  to Renee.  They reside in a house and he works full-time in [employer].  I will follow-up with him again in approximately 5 weeks and instructed him to call with any questions or concerns that might come up before then.    Patient's most recent   Lab Results   Component Value Date    A1C 7.6 02/09/2024     is not meeting goal of <7.0    Diabetes knowledge and skills assessment:       Continue education with the following diabetes management concepts: Healthy Eating, Being Active, Problem Solving, Reducing Risks, and Healthy Coping    Based on learning assessment above, most appropriate setting for further diabetes education would be: Individual setting.      PLAN  1. Eat 3 balanced meals each day - Monitor carb intake and limit to 60-75 grams per meal  This would be equal to 4-5 choices ~  1 choice = 15 grams    Do not wait longer than 4-5 hours to eat something  Snacks limit to no more than 30 grams of carbohydrates or 2 choices  Make sure you include protein source with each meal and at bedtime - this has been shown to help with blood glucose elevations    2. Check blood sugars  2  times each day  - when you wake up before eating anything  - 2 hours AFTER dinner    Fasting and before meal target is 80 - 130   2 hours after a meal target is < 180  remember to bring meter and log book to all appointments    3. Incorporate 30 minutes activity into each day - does not need to be all at one time & walking counts    4. Take diabetes medications as prescribed Metformin 500 mg daily , if your morning blood glucose numbers are over 130 more than they are under 130 , increase Metformin to 2 tablet with dinner    See Patient Instructions for co-developed, patient-stated behavior change goals.  AVS printed and provided to patient today. See Follow-Up section for recommended follow-up.       Topics to cover at upcoming visits: Healthy Eating, Being Active, Reducing Risks, and Healthy Coping    Follow-up: 4/30/24    See Care Plan for co-developed, patient-state behavior change goals.  AVS provided for patient today.    Education Materials Provided:  Living Healthy with Diabetes, BG Log Sheet, Carbohydrate Counting, My Plate Planner, and Accu-Chek Guide Me meter kit      SUBJECTIVE/OBJECTIVE:  Presents for: Initial Assessment for new diagnosis  Accompanied by: Self  Diabetes education in the past 24 mo: No  Focus of Visit: Monitoring, Reducing Risks, Taking Medication, Healthy Coping, Healthy Eating, Problem Solving, Being Active, Diabetes Pathophysiology, Assistance w/ making life changes, Self-care behavioral goal setting  Diabetes type: Type 2  Date of diagnosis: 2023  Disease course: Stable  Other concerns:: None  Cultural Influences/Ethnic Background:  Choose not to answer      Diabetes Symptoms & Complications:  Disease course: Stable  Complications assessed today?: Yes  Autonomic neuropathy: No  CVA: No  Heart disease: No  Nephropathy: No  Peripheral neuropathy: No  Peripheral Vascular Disease: No  Retinopathy: No    Patient Problem List and Family Medical History reviewed for relevant medical  "history, current medical status, and diabetes risk factors.    Vitals:  Wt 105.3 kg (232 lb 3.2 oz)   BMI 32.39 kg/m    Estimated body mass index is 32.39 kg/m  as calculated from the following:    Height as of 3/7/24: 1.803 m (5' 11\").    Weight as of this encounter: 105.3 kg (232 lb 3.2 oz).   Last 3 BP:   BP Readings from Last 3 Encounters:   03/07/24 (!) 144/97   02/23/24 124/87   02/09/24 (!) 137/101       History   Smoking Status     Never   Smokeless Tobacco     Never       Labs:  Lab Results   Component Value Date    A1C 7.6 02/09/2024     Lab Results   Component Value Date     08/09/2023    GLC 90 08/08/2018     Lab Results   Component Value Date    LDL  08/09/2023      Comment:      Cannot estimate LDL when triglyceride exceeds 400 mg/dL    LDL 93 08/09/2023     08/08/2018     Direct Measure HDL   Date Value Ref Range Status   08/09/2023 33 (L) >=40 mg/dL Final   ]  GFR Estimate   Date Value Ref Range Status   08/09/2023 >90 >60 mL/min/1.73m2 Final   08/08/2018 >60 >60 mL/min/1.73m2 Final     GFR Estimate If Black   Date Value Ref Range Status   08/08/2018 >60 >60 mL/min/1.73m2 Final     Lab Results   Component Value Date    CR 0.98 08/09/2023     No results found for: \"MICROALBUMIN\"    Healthy Eating:  Healthy Eating Assessed Today: Yes  Cultural/[place] diet restrictions?: No  Do you have any food allergies or intolerances?: No  Meal planning/habits: Smaller portions  Who cooks/prepares meals for you?: Self, Spouse  Who purchases food in  your home?: Spouse  Meals include: Breakfast, Lunch, Dinner, Afternoon Snack  Breakfast: today was oatmeal with raisins and small amt of brown sugar   WE might have eggs with toast and butter  Lunch: leftovers usually - had shrimp gnocchi recently  Dinner: +protein + vegetable sometimes ( not a big fan of veggies)  proteins - chicken , beef , venison  Snacks: sunflower seeds  Other: veggies he does like / will eat: carrots, potatoes, pickles, mushrooms, " cooked onions  Beverages: Water, Juice, Milk, Tea, Sports drinks, Alcohol, Other (see Comments)  Please elaborate:: green tea, 6 oz juice daily, alcohol: Carbliss, Viviane Lite, apple crown and 7  Has patient met with a dietitian in the past?: No    Being Active:  Being Active Assessed Today: Yes  Exercise:: Currently not exercising  Barrier to exercise: None    Monitoring:  Blood Glucose Meter: Accu-chek (provided today)        Taking Medications:  Diabetes Medication(s)       Biguanides       metFORMIN (GLUCOPHAGE) 500 MG tablet Take 1 tablet (500 mg) by mouth daily (with dinner)            Taking Medication Assessed Today: Yes  Current Treatments: Oral Medication (taken by mouth), Diet  Problems taking diabetes medications regularly?: No  Diabetes medication side effects?: No    Problem Solving:  Problem Solving Assessed Today: Yes  Is the patient at risk for hypoglycemia?: No  Is the patient at risk for DKA?: No  Does patient have severe weather/disaster plan for diabetes management?: Not Needed  Does patient have sick day plan for diabetes management?: Not Needed              Reducing Risks:  Reducing Risks Assessed Today: Yes  Diabetes Risks: Sedentary Lifestyle, Hypertriglyceridemia  CAD Risks: Male sex, Sedentary lifestyle, Diabetes Mellitus, Hypertension, Obesity    Healthy Coping:  Healthy Coping Assessed Today: Yes  Emotional response to diabetes: Ready to learn  Informal Support system:: Spouse  Stage of change: PREPARATION (Decided to change - considering how)  Support resources: In-person Offerings  Patient Activation Measure Survey Score:       No data to display                  Care Plan and Education Provided:  Patient was instructed on Accu-Chek Guide Me meter and was able to provide an accurate return demonstration. Patient's blood glucose reading today was 221 mg/dL.  Approximately 2 hours postmeal  Care Plan: Diabetes   Updates made by Yue Vanegas RN since 3/19/2024 12:00 AM        Problem:  HbA1C Not In Goal         Goal: Establish Regular Follow-Ups with PCP    This Visit's Progress: 60%   Note:    Future follow-up in place with [facility] only       Task: Discuss with PCP the recommended timing for patient's next follow up visit(s) Completed 3/19/2024   Responsible User: Yue Vanegas RN        Task: Discuss schedule for PCP visits with patient Completed 3/19/2024   Responsible User: Yue Vanegas RN        Goal: Get HbA1C Level in Goal    This Visit's Progress: 70%   Note:    Current A1c = 7.6% from previous 9.9%       Task: Educate patient on diabetes education self-management topics Completed 3/19/2024   Responsible User: Yue Vanegas RN        Task: Educate patient on benefits of regular glucose monitoring Completed 3/19/2024   Responsible User: Yue Vanegas RN        Task: Refer patient to appropriate extended care team member, as needed (Medication Therapy Management, Behavioral Health, Physical Therapy, etc.)    Responsible User: Yue Vanegas RN        Task: Discuss diabetes treatment plan with patient Completed 3/19/2024   Responsible User: Yue Vanegas RN        Problem: Diabetes Self-Management Education Needed to Optimize Self-Care Behaviors         Goal: Understand diabetes pathophysiology and disease progression    This Visit's Progress: 100%   Note:    This was completed at visit today.       Task: Provide education on diabetes pathophysiology and disease progression specfic to patient's diabetes type    Responsible User: Yue Vanegas RN        Goal: Healthy Eating - follow a healthy eating pattern for diabetes    This Visit's Progress: 70%   Note:    Reassess at follow-up       Task: Provide education on portion control and consistency in amount, composition and timing of food intake Completed 3/19/2024   Responsible User: Yue Vanegas RN        Task: Provide education on managing carbohydrate intake (carbohydrate counting, plate planning method, etc.)  Completed 3/19/2024   Responsible User: Yue Vanegas RN        Task: Provide education on weight management Completed 3/19/2024   Responsible User: Yue Vanegas RN        Task: Provide education on heart healthy eating    Responsible User: Yue Vanegas RN        Task: Provide education on eating out Completed 3/19/2024   Responsible User: Yue Vanegas RN        Task: Develop individualized healthy eating plan with patient Completed 3/19/2024   Responsible User: Yue Vanegas RN        Goal: Being Active - get regular physical activity, working up to at least 150 minutes per week    This Visit's Progress: 40%        Task: Provide education on relationship of activity to glucose and precautions to take if at risk for low glucose Completed 3/19/2024   Responsible User: Yue Vanegas RN        Task: Discuss barriers to physical activity with patient Completed 3/19/2024   Responsible User: Yue Vanegas RN        Task: Develop physical activity plan with patient    Responsible User: Yue Vanegas RN        Task: Explore community resources including walking groups, assistance programs, and home videos    Responsible User: Yue Vanegas RN        Goal: Monitoring - monitor glucose and ketones as directed    Note:    Glucose monitor and testing supplies supplied to patient today       Task: Provide education on blood glucose monitoring (purpose, proper technique, frequency, glucose targets, interpreting results, when to use glucose control solution, sharps disposal)    Responsible User: Yue Vanegas RN        Task: Provide education on continuous glucose monitoring (sensor placement, use of tej or /reader, understanding glucose trends, alerts and alarms, differences between sensor glucose and blood glucose)    Responsible User: Yue Vanegas RN        Task: Provide education on ketone monitoring (when to monitor, frequency, etc.)    Responsible User: Yue Vanegas  A, RN        Goal: Taking Medication - patient is consistently taking medications as directed    This Visit's Progress: 100%   Note:    Reports having no missed or skipped doses and is tolerating well       Task: Provide education on action of prescribed medication, including when to take and possible side effects Completed 3/19/2024   Responsible User: Yue Vanegas RN        Task: Provide education on insulin and injectable diabetes medications, including administration, storage, site selection and rotation for injection sites    Responsible User: Yue Vanegas RN        Task: Discuss barriers to medication adherence with patient and provide management technique ideas as appropriate    Responsible User: Yue Vanegas RN        Task: Provide education on frequency and refill details of medications    Responsible User: Yue Vanegas RN        Goal: Problem Solving - know how to prevent and manage short-term diabetes complications    This Visit's Progress: 60%        Task: Provide education on high blood glucose - causes, signs/symptoms, prevention and treatment Completed 3/19/2024   Responsible User: Yue Vanegas RN        Task: Provide education on low blood glucose - causes, signs/symptoms, prevention, treatment, carrying a carbohydrate source at all times, and medical identification Completed 3/19/2024   Responsible User: Yue Vanegas RN        Task: Provide education on safe travel with diabetes    Responsible User: Yue Vanegas RN        Task: Provide education on how to care for diabetes on sick days    Responsible User: Yue Vanegas RN        Task: Provide education on when to call a health care provider    Responsible User: Yue Vanegas RN        Goal: Reducing Risks - know how to prevent and treat long-term diabetes complications    This Visit's Progress: 50%        Task: Provide education on major complications of diabetes, prevention, early diagnostic measures and  treatment of complications Completed 3/19/2024   Responsible User: Yue Vanegas RN        Task: Provide education on recommended care for dental, eye and foot health    Responsible User: Yue Vanegas RN        Task: Provide education on Hemoglobin A1c - goals and relationship to blood glucose levels Completed 3/19/2024   Responsible User: Yue Vanegas RN        Task: Provide education on recommendations for heart health - lipid levels and goals, blood pressure and goals, and aspirin therapy, if indicated    Responsible User: Yue Vanegas RN        Task: Provide education on tobacco cessation    Responsible User: Yue Vanegas RN        Goal: Healthy Coping - use available resources to cope with the challenges of managing diabetes    This Visit's Progress: 60%        Task: Discuss recognizing feelings about having diabetes Completed 3/19/2024   Responsible User: Yue Vanegas RN        Task: Provide education on the benefits of making appropriate lifestyle changes Completed 3/19/2024   Responsible User: Yue Vanegas RN        Task: Provide education on benefits of utilizing support systems Completed 3/19/2024   Responsible User: Yue Vanegas RN        Task: Discuss methods for coping with stress    Responsible User: Yue Vanegas RN        Task: Provide education on when to seek professional counseling    Responsible User: Yue Vanegas RN          Thank you,  Yue Vanegas RN [facility]  Certified Diabetes Care and   Visit type : DSMT      Time Spent: 60 minutes  Encounter Type: Individual    Any diabetes medication dose changes were made via the CDE Protocol per the patient's referring provider and primary care provider. A copy of this encounter was shared with the provider.   Much or all of the text in this note was generated through the use of the Dragon Dictate voice-to-text software.Errors in spelling or words which seem out of context are  unintentional. Sound alike errors, in particular, may have escaped editing.

## 2024-03-19 NOTE — PROGRESS NOTES
Diabetes Self-Management Education & Support    Presents for: Initial Assessment for new diagnosis    Type of Service: In Person Visit      ASSESSMENT:  Brent is a pleasant 38-year-old who comes to clinic today to receive initial education for new diagnosis of DMT2.  He arrived today accompanied by his wife Renee.  Medications were reviewed and he confirms he is currently taking 500 mg of metformin p.o. daily, with no missed or skipped doses and is tolerating well.  Brent was very receptive to all the information/education that was provided to him today.  Brent is  to Renee.  They reside in a house and he works full-time in [employer].  I will follow-up with him again in approximately 5 weeks and instructed him to call with any questions or concerns that might come up before then.    Patient's most recent   Lab Results   Component Value Date    A1C 7.6 02/09/2024     is not meeting goal of <7.0    Diabetes knowledge and skills assessment:       Continue education with the following diabetes management concepts: Healthy Eating, Being Active, Problem Solving, Reducing Risks, and Healthy Coping    Based on learning assessment above, most appropriate setting for further diabetes education would be: Individual setting.      PLAN  1. Eat 3 balanced meals each day - Monitor carb intake and limit to 60-75 grams per meal  This would be equal to 4-5 choices ~  1 choice = 15 grams    Do not wait longer than 4-5 hours to eat something  Snacks limit to no more than 30 grams of carbohydrates or 2 choices  Make sure you include protein source with each meal and at bedtime - this has been shown to help with blood glucose elevations    2. Check blood sugars 2  times each day  - when you wake up before eating anything  - 2 hours AFTER dinner    Fasting and before meal target is 80 - 130   2 hours after a meal target is < 180  remember to bring meter and log book to all appointments    3. Incorporate 30 minutes  "activity into each day - does not need to be all at one time & walking counts    4. Take diabetes medications as prescribed Metformin 500 mg daily , if your morning blood glucose numbers are over 130 more than they are under 130 , increase Metformin to 2 tablet with dinner    See Patient Instructions for co-developed, patient-stated behavior change goals.  AVS printed and provided to patient today. See Follow-Up section for recommended follow-up.       Topics to cover at upcoming visits: Healthy Eating, Being Active, Reducing Risks, and Healthy Coping    Follow-up: 4/30/24    See Care Plan for co-developed, patient-state behavior change goals.  AVS provided for patient today.    Education Materials Provided:  Living Healthy with Diabetes, BG Log Sheet, Carbohydrate Counting, My Plate Planner, and Accu-Chek Guide Me meter kit      SUBJECTIVE/OBJECTIVE:  Presents for: Initial Assessment for new diagnosis  Accompanied by: Self  Diabetes education in the past 24 mo: No  Focus of Visit: Monitoring, Reducing Risks, Taking Medication, Healthy Coping, Healthy Eating, Problem Solving, Being Active, Diabetes Pathophysiology, Assistance w/ making life changes, Self-care behavioral goal setting  Diabetes type: Type 2  Date of diagnosis: 2023  Disease course: Stable  Other concerns:: None  Cultural Influences/Ethnic Background:  Choose not to answer      Diabetes Symptoms & Complications:  Disease course: Stable  Complications assessed today?: Yes  Autonomic neuropathy: No  CVA: No  Heart disease: No  Nephropathy: No  Peripheral neuropathy: No  Peripheral Vascular Disease: No  Retinopathy: No    Patient Problem List and Family Medical History reviewed for relevant medical history, current medical status, and diabetes risk factors.    Vitals:  Wt 105.3 kg (232 lb 3.2 oz)   BMI 32.39 kg/m    Estimated body mass index is 32.39 kg/m  as calculated from the following:    Height as of 3/7/24: 1.803 m (5' 11\").    Weight as of this " "encounter: 105.3 kg (232 lb 3.2 oz).   Last 3 BP:   BP Readings from Last 3 Encounters:   03/07/24 (!) 144/97   02/23/24 124/87   02/09/24 (!) 137/101       History   Smoking Status    Never   Smokeless Tobacco    Never       Labs:  Lab Results   Component Value Date    A1C 7.6 02/09/2024     Lab Results   Component Value Date     08/09/2023    GLC 90 08/08/2018     Lab Results   Component Value Date    LDL  08/09/2023      Comment:      Cannot estimate LDL when triglyceride exceeds 400 mg/dL    LDL 93 08/09/2023     08/08/2018     Direct Measure HDL   Date Value Ref Range Status   08/09/2023 33 (L) >=40 mg/dL Final   ]  GFR Estimate   Date Value Ref Range Status   08/09/2023 >90 >60 mL/min/1.73m2 Final   08/08/2018 >60 >60 mL/min/1.73m2 Final     GFR Estimate If Black   Date Value Ref Range Status   08/08/2018 >60 >60 mL/min/1.73m2 Final     Lab Results   Component Value Date    CR 0.98 08/09/2023     No results found for: \"MICROALBUMIN\"    Healthy Eating:  Healthy Eating Assessed Today: Yes  Cultural/Roman Catholic diet restrictions?: No  Do you have any food allergies or intolerances?: No  Meal planning/habits: Smaller portions  Who cooks/prepares meals for you?: Self, Spouse  Who purchases food in  your home?: Spouse  Meals include: Breakfast, Lunch, Dinner, Afternoon Snack  Breakfast: today was oatmeal with raisins and small amt of brown sugar   WE might have eggs with toast and butter  Lunch: leftovers usually - had shrimp gnocchi recently  Dinner: +protein + vegetable sometimes ( not a big fan of veggies)  proteins - chicken , beef , venison  Snacks: sunflower seeds  Other: veggies he does like / will eat: carrots, potatoes, pickles, mushrooms, cooked onions  Beverages: Water, Juice, Milk, Tea, Sports drinks, Alcohol, Other (see Comments)  Please elaborate:: green tea, 6 oz juice daily, alcohol: Carbliss, Viviane Lite, apple crown and 7  Has patient met with a dietitian in the past?: No    Being " Active:  Being Active Assessed Today: Yes  Exercise:: Currently not exercising  Barrier to exercise: None    Monitoring:  Blood Glucose Meter: Accu-chek (provided today)        Taking Medications:  Diabetes Medication(s)       Biguanides       metFORMIN (GLUCOPHAGE) 500 MG tablet Take 1 tablet (500 mg) by mouth daily (with dinner)            Taking Medication Assessed Today: Yes  Current Treatments: Oral Medication (taken by mouth), Diet  Problems taking diabetes medications regularly?: No  Diabetes medication side effects?: No    Problem Solving:  Problem Solving Assessed Today: Yes  Is the patient at risk for hypoglycemia?: No  Is the patient at risk for DKA?: No  Does patient have severe weather/disaster plan for diabetes management?: Not Needed  Does patient have sick day plan for diabetes management?: Not Needed              Reducing Risks:  Reducing Risks Assessed Today: Yes  Diabetes Risks: Sedentary Lifestyle, Hypertriglyceridemia  CAD Risks: Male sex, Sedentary lifestyle, Diabetes Mellitus, Hypertension, Obesity    Healthy Coping:  Healthy Coping Assessed Today: Yes  Emotional response to diabetes: Ready to learn  Informal Support system:: Spouse  Stage of change: PREPARATION (Decided to change - considering how)  Support resources: In-person Offerings  Patient Activation Measure Survey Score:       No data to display                  Care Plan and Education Provided:  Patient was instructed on Accu-Chek Guide Me meter and was able to provide an accurate return demonstration. Patient's blood glucose reading today was 221 mg/dL.  Approximately 2 hours postmeal  Care Plan: Diabetes   Updates made by Yue Vanegas RN since 3/19/2024 12:00 AM        Problem: HbA1C Not In Goal         Goal: Establish Regular Follow-Ups with PCP    This Visit's Progress: 60%   Note:    Future follow-up in place with [facility] only       Task: Discuss with PCP the recommended timing for patient's next follow up visit(s) Completed  3/19/2024   Responsible User: Yue Vanegas RN        Task: Discuss schedule for PCP visits with patient Completed 3/19/2024   Responsible User: Yue Vanegas RN        Goal: Get HbA1C Level in Goal    This Visit's Progress: 70%   Note:    Current A1c = 7.6% from previous 9.9%       Task: Educate patient on diabetes education self-management topics Completed 3/19/2024   Responsible User: Yue Vanegas RN        Task: Educate patient on benefits of regular glucose monitoring Completed 3/19/2024   Responsible User: Yue Vanegas RN        Task: Refer patient to appropriate extended care team member, as needed (Medication Therapy Management, Behavioral Health, Physical Therapy, etc.)    Responsible User: Yue Vanegas RN        Task: Discuss diabetes treatment plan with patient Completed 3/19/2024   Responsible User: Yue Vanegas RN        Problem: Diabetes Self-Management Education Needed to Optimize Self-Care Behaviors         Goal: Understand diabetes pathophysiology and disease progression    This Visit's Progress: 100%   Note:    This was completed at visit today.       Task: Provide education on diabetes pathophysiology and disease progression specfic to patient's diabetes type    Responsible User: Yue Vanegas RN        Goal: Healthy Eating - follow a healthy eating pattern for diabetes    This Visit's Progress: 70%   Note:    Reassess at follow-up       Task: Provide education on portion control and consistency in amount, composition and timing of food intake Completed 3/19/2024   Responsible User: Yue Vanegas RN        Task: Provide education on managing carbohydrate intake (carbohydrate counting, plate planning method, etc.) Completed 3/19/2024   Responsible User: Yue Vanegas RN        Task: Provide education on weight management Completed 3/19/2024   Responsible User: Yue Vanegas RN        Task: Provide education on heart healthy eating    Responsible User:  Yue Vanegas RN        Task: Provide education on eating out Completed 3/19/2024   Responsible User: Yue Vanegas RN        Task: Develop individualized healthy eating plan with patient Completed 3/19/2024   Responsible User: Yue Vanegas RN        Goal: Being Active - get regular physical activity, working up to at least 150 minutes per week    This Visit's Progress: 40%        Task: Provide education on relationship of activity to glucose and precautions to take if at risk for low glucose Completed 3/19/2024   Responsible User: Yue Vanegas RN        Task: Discuss barriers to physical activity with patient Completed 3/19/2024   Responsible User: Yue Vanegas RN        Task: Develop physical activity plan with patient    Responsible User: Yue Vanegas RN        Task: Explore community resources including walking groups, assistance programs, and home videos    Responsible User: Yue Vangeas RN        Goal: Monitoring - monitor glucose and ketones as directed    Note:    Glucose monitor and testing supplies supplied to patient today       Task: Provide education on blood glucose monitoring (purpose, proper technique, frequency, glucose targets, interpreting results, when to use glucose control solution, sharps disposal)    Responsible User: Yue Vanegas RN        Task: Provide education on continuous glucose monitoring (sensor placement, use of tej or /reader, understanding glucose trends, alerts and alarms, differences between sensor glucose and blood glucose)    Responsible User: Yue Vanegas RN        Task: Provide education on ketone monitoring (when to monitor, frequency, etc.)    Responsible User: Yue Vanegas RN        Goal: Taking Medication - patient is consistently taking medications as directed    This Visit's Progress: 100%   Note:    Reports having no missed or skipped doses and is tolerating well       Task: Provide education on action of  prescribed medication, including when to take and possible side effects Completed 3/19/2024   Responsible User: Yue Vanegas RN        Task: Provide education on insulin and injectable diabetes medications, including administration, storage, site selection and rotation for injection sites    Responsible User: Yue Vanegas RN        Task: Discuss barriers to medication adherence with patient and provide management technique ideas as appropriate    Responsible User: Yue Vanegas RN        Task: Provide education on frequency and refill details of medications    Responsible User: Yue Vanegas RN        Goal: Problem Solving - know how to prevent and manage short-term diabetes complications    This Visit's Progress: 60%        Task: Provide education on high blood glucose - causes, signs/symptoms, prevention and treatment Completed 3/19/2024   Responsible User: Yue Vanegas RN        Task: Provide education on low blood glucose - causes, signs/symptoms, prevention, treatment, carrying a carbohydrate source at all times, and medical identification Completed 3/19/2024   Responsible User: Yue Vanegas RN        Task: Provide education on safe travel with diabetes    Responsible User: Yue Vanegas RN        Task: Provide education on how to care for diabetes on sick days    Responsible User: Yue Vanegas RN        Task: Provide education on when to call a health care provider    Responsible User: Yue Vanegas RN        Goal: Reducing Risks - know how to prevent and treat long-term diabetes complications    This Visit's Progress: 50%        Task: Provide education on major complications of diabetes, prevention, early diagnostic measures and treatment of complications Completed 3/19/2024   Responsible User: Yue Vanegas RN        Task: Provide education on recommended care for dental, eye and foot health    Responsible User: Yue Vanegas RN        Task: Provide education  on Hemoglobin A1c - goals and relationship to blood glucose levels Completed 3/19/2024   Responsible User: Yue Vanegas RN        Task: Provide education on recommendations for heart health - lipid levels and goals, blood pressure and goals, and aspirin therapy, if indicated    Responsible User: Yue Vanegas RN        Task: Provide education on tobacco cessation    Responsible User: Yue Vanegas RN        Goal: Healthy Coping - use available resources to cope with the challenges of managing diabetes    This Visit's Progress: 60%        Task: Discuss recognizing feelings about having diabetes Completed 3/19/2024   Responsible User: Yue Vanegas RN        Task: Provide education on the benefits of making appropriate lifestyle changes Completed 3/19/2024   Responsible User: Yue Vanegas RN        Task: Provide education on benefits of utilizing support systems Completed 3/19/2024   Responsible User: Yue Vanegas RN        Task: Discuss methods for coping with stress    Responsible User: Yue Vanegas RN        Task: Provide education on when to seek professional counseling    Responsible User: Yue Vanegas RN          Thank you,  Yue Vanegas RN [facility]  Certified Diabetes Care and   Visit type : DSMT      Time Spent: 60 minutes  Encounter Type: Individual    Any diabetes medication dose changes were made via the CDE Protocol per the patient's referring provider and primary care provider. A copy of this encounter was shared with the provider.   Much or all of the text in this note was generated through the use of the Dragon Dictate voice-to-text software.Errors in spelling or words which seem out of context are unintentional. Sound alike errors, in particular, may have escaped editing.

## 2024-05-12 ENCOUNTER — HEALTH MAINTENANCE LETTER (OUTPATIENT)
Age: 39
End: 2024-05-12

## 2024-06-04 ENCOUNTER — ALLIED HEALTH/NURSE VISIT (OUTPATIENT)
Dept: EDUCATION SERVICES | Facility: CLINIC | Age: 39
End: 2024-06-04
Payer: COMMERCIAL

## 2024-06-04 ENCOUNTER — LAB (OUTPATIENT)
Dept: LAB | Facility: CLINIC | Age: 39
End: 2024-06-04
Payer: COMMERCIAL

## 2024-06-04 VITALS — WEIGHT: 222 LBS | BODY MASS INDEX: 30.96 KG/M2

## 2024-06-04 DIAGNOSIS — E11.65 TYPE 2 DIABETES MELLITUS WITH HYPERGLYCEMIA, WITHOUT LONG-TERM CURRENT USE OF INSULIN (H): ICD-10-CM

## 2024-06-04 DIAGNOSIS — E11.9 DIABETES MELLITUS, TYPE 2 (H): Primary | ICD-10-CM

## 2024-06-04 DIAGNOSIS — E11.9 DIABETES MELLITUS, TYPE 2 (H): ICD-10-CM

## 2024-06-04 LAB — HBA1C MFR BLD: 6.6 % (ref 0–5.6)

## 2024-06-04 PROCEDURE — 36415 COLL VENOUS BLD VENIPUNCTURE: CPT

## 2024-06-04 PROCEDURE — G0108 DIAB MANAGE TRN  PER INDIV: HCPCS

## 2024-06-04 PROCEDURE — 83036 HEMOGLOBIN GLYCOSYLATED A1C: CPT

## 2024-06-04 NOTE — PATIENT INSTRUCTIONS
1. Eat 3 balanced meals each day - Monitor carb intake and limit to 60-75 grams per meal  This would be equal to 4-5 choices ~  1 choice = 15 grams    Do not wait longer than 4-5 hours to eat something  Snacks limit to no more than 30 grams of carbohydrates or 2 choices  Make sure you include protein source with each meal and at bedtime - this has been shown to help with blood glucose elevations    2. Check blood sugars once each day - please try and alternate the times you check between am fasting( right after you wake before eating) and 2 hours AFTER dinner - this allows us to get a better idea of what is happening throughout your day , not at just one time     Fasting and before meal target is 80 - 130   2 hours after a meal target is < 180  remember to bring meter and log book to all appointments    3. Incorporate 30 minutes activity into each day - does not need to be all at one time & walking counts    4. Take diabetes medications as prescribed Metformin 500 mg once daily      A1c today was : 6.6 % NICELY DONE !!      Thank you for coming in to see me today !      I value your experience and would be very thankful for your time in providing feedback in our clinic survey.    You may receive an e-mail, text message or even something in the mail from [employer].  This is a survey to let us know how we are doing - the survey will be related to your diabetes education and me.

## 2024-06-04 NOTE — LETTER
6/4/2024         RE: Vinayak Jarquin  [address] [place] [id number]        Dear Colleague,    Thank you for referring your patient, Vinayak Jarquin, to the [facility]. Please see a copy of my visit note below.    Diabetes Self-Management Education & Support    Presents for: Individual review    Type of Service: In Person Visit      ASSESSMENT:  Brent is a very pleasant 39-year-old who returns to clinic today after receiving initial education on 3/19/2024.  He arrived today unaccompanied and unfortunately did not have his glucose meter or any blood glucose data with him.  Stated he came directly from work and had forgotten to take it with him this morning.  Medications were reviewed and he confirms he is presently taking 500 mg of metformin p.o. daily with no missed or skipped doses.  Intake was reviewed and Brent is eating 3 meals daily which appear to be well-balanced and eaten in a timely fashion.  He has eliminated regular pop from his diet and his consumption of sweets and junk food have significantly declined.  Prior to her appointment today we did recheck his A1c and was very pleased to return at 6.6% from a previous 7.6%.  I congratulated him on this and commended him on the work and effort he had put forth since our initial meeting to achieve this.  Encouraged him to keep up the good work.  Now that weather has improved his activity level has increased.  He is planning softball every Monday and volleyball on Thursdays.  The weekends he goes to their cabin.  Overall he is doing well and his wife is supportive.   he did ask today about discontinuing the metformin and I informed him we would revisit this at our follow-up in September when we recheck his A1c again.  I instructed him to call with any questions or concerns that might come up before that time.    Patient's most recent   Lab Results   Component Value Date    A1C 6.6 06/04/2024     is meeting goal of  "<7.0    Diabetes knowledge and skills assessment:   Patient is knowledgeable in diabetes management concepts related to: Healthy Eating, Being Active, and Taking Medication    Continue education with the following diabetes management concepts: Healthy Eating, Monitoring, Reducing Risks, and Healthy Coping    Based on learning assessment above, most appropriate setting for further diabetes education would be: Individual setting.      PLAN    See Patient Instructions for co-developed, patient-stated behavior change goals.  AVS printed and provided to patient today. See Follow-Up section for recommended follow-up.     Will discuss discontinuing Metformin at [place] if A1c remains <7%  Topics to cover at upcoming visits: Healthy Eating, Being Active, Taking Medication, Reducing Risks, and Healthy Coping    Follow-up: 9/17/24    See Care Plan for co-developed, patient-state behavior change goals.  AVS provided for patient today.    Education Materials Provided:  No new materials provided today      SUBJECTIVE/OBJECTIVE:  Presents for: Individual review  Accompanied by: Self  Diabetes education in the past 24 mo: Yes (3/19/24  initial ed)  Focus of Visit: Monitoring, Reducing Risks, Taking Medication, Healthy Coping, Healthy Eating, Problem Solving, Being Active, Assistance w/ making life changes, Self-care behavioral goal setting  Diabetes type: Type 2  Date of diagnosis: 2023  Disease course: Improving  Other concerns:: None  Cultural Influences/Ethnic Background:  Choose not to answer      Diabetes Symptoms & Complications:  Weight trend: Decreasing  Disease course: Improving  Complications assessed today?: No    Patient Problem List and Family Medical History reviewed for relevant medical history, current medical status, and diabetes risk factors.    Vitals:  Wt 100.7 kg (222 lb)   BMI 30.96 kg/m    Estimated body mass index is 30.96 kg/m  as calculated from the following:    Height as of 3/7/24: 1.803 m (5' 11\").    " "Weight as of this encounter: 100.7 kg (222 lb).   Last 3 BP:   BP Readings from Last 3 Encounters:   03/07/24 (!) 144/97   02/23/24 124/87   02/09/24 (!) 137/101       History   Smoking Status     Never   Smokeless Tobacco     Never       Labs:  Lab Results   Component Value Date    A1C 6.6 06/04/2024     Lab Results   Component Value Date     08/09/2023    GLC 90 08/08/2018     Lab Results   Component Value Date    LDL  08/09/2023      Comment:      Cannot estimate LDL when triglyceride exceeds 400 mg/dL    LDL 93 08/09/2023     08/08/2018     Direct Measure HDL   Date Value Ref Range Status   08/09/2023 33 (L) >=40 mg/dL Final   ]  GFR Estimate   Date Value Ref Range Status   08/09/2023 >90 >60 mL/min/1.73m2 Final   08/08/2018 >60 >60 mL/min/1.73m2 Final     GFR Estimate If Black   Date Value Ref Range Status   08/08/2018 >60 >60 mL/min/1.73m2 Final     Lab Results   Component Value Date    CR 0.98 08/09/2023     No results found for: \"MICROALBUMIN\"    Healthy Eating:  Healthy Eating Assessed Today: Yes  Cultural/Zoroastrian diet restrictions?: No  Do you have any food allergies or intolerances?: No  Meal planning/habits: Smaller portions, Avoiding sweets  Who cooks/prepares meals for you?: Self, Spouse  Who purchases food in  your home?: Spouse  Meals include: Breakfast, Lunch, Dinner, Afternoon Snack  Breakfast: 3 eggs  Lunch: leftovers usually - typically largest meal of day  Dinner: +protein + vegetable sometimes ( not a big fan of veggies)  proteins - chicken , beef , venison  Snacks: sunflower seeds, jerky, nuts  Other: veggies he does like / will eat: carrots, potatoes, pickles, mushrooms, cooked onions  Beverages: Water, Juice, Milk, Tea, Sports drinks, Alcohol, Other (see Comments)  Please elaborate:: green tea, alcohol: Carbliss, Viviane Lite, apple crown and 7  Has patient met with a dietitian in the past?: No    Being Active:  Being Active Assessed Today: Yes  Exercise:: Yes (Softball on Mon, " Volleyball on Thur - working on [employer] for house and has cabin he goes to on [place])  Days per week of moderate to strenuous exercise (like a brisk walk): 3  On average, minutes per day of exercise at this level: 50  How intense was your typical exercise? : Heavy (like jogging or swimming)  Exercise Minutes per Week: 150  Barrier to exercise: None    Monitoring:  Monitoring Assessed Today: Yes  Did patient bring glucose meter to appointment? : No (forgot, came straight from work)  Blood Glucose Meter: Accu-chek (provided today)  Times checking blood sugar at home (number): 1  Times checking blood sugar at home (per): Day    Unfortunately Brent did not have his meter or any blood glucose data with him today so all blood glucose information came solely from his recall alone.  Per his report he is checking his blood glucose once daily and has been alternating times between a.m. fasting and 2 hours after dinner.  A.m. fasting readings have ranged anywhere from the upper 90's to 145 mg/dL (which was the highest), today's reading was 138 mg/dL and  his postmeal #'s in the 120's to 160's (last evening was 128 mg/dL).     Taking Medications:  Diabetes Medication(s)       Biguanides       metFORMIN (GLUCOPHAGE) 500 MG tablet Take 1 tablet (500 mg) by mouth daily (with dinner)            Taking Medication Assessed Today: Yes  Current Treatments: Oral Medication (taken by mouth), Diet  Problems taking diabetes medications regularly?: No  Diabetes medication side effects?: No    Problem Solving:  Problem Solving Assessed Today: Yes  Is the patient at risk for hypoglycemia?: No  Is the patient at risk for DKA?: No  Does patient have severe weather/disaster plan for diabetes management?: Not Needed  Does patient have sick day plan for diabetes management?: Not Needed              Reducing Risks:  Reducing Risks Assessed Today: Yes  Diabetes Risks: Sedentary Lifestyle, Hypertriglyceridemia  CAD Risks: Male sex, Sedentary lifestyle,  Diabetes Mellitus, Hypertension, Obesity    Healthy Coping:  Healthy Coping Assessed Today: Yes  Emotional response to diabetes: Ready to learn  Informal Support system:: Spouse  Stage of change: ACTION (Actively working towards change)  Support resources: In-person Offerings  Patient Activation Measure Survey Score:       No data to display                  Care Plan and Education Provided:  Healthy Eating: Balanced meals, Consistency in amount and timing of carbohydrate intake, Eating out, Portion control, and Weight Management, Being Active: Amount recommended (150 minutes moderate or 75 minutes vigorous activity and 2-3 days strength training per week) and Relationship of activity to glucose, Monitoring: Individual glucose targets and Purpose, Taking Medication: Action of prescribed medication(s) and When to take medication(s), Reducing Risks: Eye care, Goal for A1c, how it relates to glucose and how often to check, Preventing cardiovascular disease, including blood pressure goals, lipid goals, recommendations for cardioprotective medications, statins, and aspirin, Prevention, early diagnostic measures and treatment of complications, and A1C - goals, relating to blood glucose levels, how often to check, and Healthy Coping: Benefits of making appropriate lifestyle changes, Identifying helpful resources, and Utilize support systems  Care Plan: Diabetes   Updates made by Yue Vanegas RN since 6/5/2024 12:00 AM        Problem: HbA1C Not In Goal         Goal: Establish Regular Follow-Ups with PCP    This Visit's Progress: 80%   Recent Progress: 60%   Note:    Future follow-up in place with [facility] only       Goal: Get HbA1C Level in Goal    This Visit's Progress: 100%   Recent Progress: 70%   Note:    Current A1c = 6.6% from previous 9.9%       Problem: Diabetes Self-Management Education Needed to Optimize Self-Care Behaviors         Goal: Healthy Eating - follow a healthy eating pattern for diabetes    This  Visit's Progress: 80%   Recent Progress: 70%   Note:    Reassess at follow-up       Goal: Being Active - get regular physical activity, working up to at least 150 minutes per week    This Visit's Progress: 90%   Recent Progress: 40%        Goal: Taking Medication - patient is consistently taking medications as directed    This Visit's Progress: 100%   Recent Progress: 100%   Note:    Reports having no missed or skipped doses and is tolerating well       Goal: Healthy Coping - use available resources to cope with the challenges of managing diabetes    This Visit's Progress: 80%   Recent Progress: 60%          Thank you,  Yue Martin RN [facility]  Certified Diabetes Care and   Visit type : DSMT      Time Spent: 30 minutes  Encounter Type: Individual    Any diabetes medication dose changes were made via the CDE Protocol per the patient's referring provider and primary care provider. A copy of this encounter was shared with the provider.   Much or all of the text in this note was generated through the use of the Dragon Dictate voice-to-text software.Errors in spelling or words which seem out of context are unintentional. Sound alike errors, in particular, may have escaped editing.

## 2024-06-05 NOTE — PROGRESS NOTES
1. Eat 3 balanced meals each day - Monitor carb intake and limit to 60-75 grams per meal  This would be equal to 4-5 choices ~  1 choice = 15 grams    Do not wait longer than 4-5 hours to eat something  Snacks limit to no more than 30 grams of carbohydrates or 2 choices  Make sure you include protein source with each meal and at bedtime - this has been shown to help with blood glucose elevations    2.  Check blood sugars once each day - please try and alternate the times you check between am fasting( right after you wake before eating) and 2 hours AFTER dinner - this allows us to get a better idea of what is happening throughout your day , not at just one time     Fasting and before meal target is 80 - 130   2 hours after a meal target is < 180  remember to bring meter and log book to all appointments    3. Incorporate 30 minutes activity into each day - does not need to be all at one time & walking counts    4. Take diabetes medications as prescribed Continue Metformin 500 mg daily - will discuss discontinuing at next visit if A1c remains < 7%      Thank you for coming in to see me today !      I value your experience and would be very thankful for your time in providing feedback in our clinic survey.    You may receive an e-mail, text message or even something in the mail from [employer].  This is a survey to let us know how we are doing - the survey will be related to your diabetes education and me.

## 2024-06-05 NOTE — PROGRESS NOTES
Diabetes Self-Management Education & Support    Presents for: Individual review    Type of Service: In Person Visit      ASSESSMENT:  Brent is a very pleasant 39-year-old who returns to clinic today after receiving initial education on 3/19/2024.  He arrived today unaccompanied and unfortunately did not have his glucose meter or any blood glucose data with him.  Stated he came directly from work and had forgotten to take it with him this morning.  Medications were reviewed and he confirms he is presently taking 500 mg of metformin p.o. daily with no missed or skipped doses.  Intake was reviewed and Brent is eating 3 meals daily which appear to be well-balanced and eaten in a timely fashion.  He has eliminated regular pop from his diet and his consumption of sweets and junk food have significantly declined.  Prior to her appointment today we did recheck his A1c and was very pleased to return at 6.6% from a previous 7.6%.  I congratulated him on this and commended him on the work and effort he had put forth since our initial meeting to achieve this.  Encouraged him to keep up the good work.  Now that weather has improved his activity level has increased.  He is planning softball every Monday and volleyball on Thursdays.  The weekends he goes to their cabin.  Overall he is doing well and his wife is supportive.   he did ask today about discontinuing the metformin and I informed him we would revisit this at our follow-up in September when we recheck his A1c again.  I instructed him to call with any questions or concerns that might come up before that time.    Patient's most recent   Lab Results   Component Value Date    A1C 6.6 06/04/2024     is meeting goal of <7.0    Diabetes knowledge and skills assessment:   Patient is knowledgeable in diabetes management concepts related to: Healthy Eating, Being Active, and Taking Medication    Continue education with the following diabetes management concepts: Healthy Eating,  "Monitoring, Reducing Risks, and Healthy Coping    Based on learning assessment above, most appropriate setting for further diabetes education would be: Individual setting.      PLAN    See Patient Instructions for co-developed, patient-stated behavior change goals.  AVS printed and provided to patient today. See Follow-Up section for recommended follow-up.     Will discuss discontinuing Metformin at [place] if A1c remains <7%  Topics to cover at upcoming visits: Healthy Eating, Being Active, Taking Medication, Reducing Risks, and Healthy Coping    Follow-up: 9/17/24    See Care Plan for co-developed, patient-state behavior change goals.  AVS provided for patient today.    Education Materials Provided:  No new materials provided today      SUBJECTIVE/OBJECTIVE:  Presents for: Individual review  Accompanied by: Self  Diabetes education in the past 24 mo: Yes (3/19/24  initial ed)  Focus of Visit: Monitoring, Reducing Risks, Taking Medication, Healthy Coping, Healthy Eating, Problem Solving, Being Active, Assistance w/ making life changes, Self-care behavioral goal setting  Diabetes type: Type 2  Date of diagnosis: 2023  Disease course: Improving  Other concerns:: None  Cultural Influences/Ethnic Background:  Choose not to answer      Diabetes Symptoms & Complications:  Weight trend: Decreasing  Disease course: Improving  Complications assessed today?: No    Patient Problem List and Family Medical History reviewed for relevant medical history, current medical status, and diabetes risk factors.    Vitals:  Wt 100.7 kg (222 lb)   BMI 30.96 kg/m    Estimated body mass index is 30.96 kg/m  as calculated from the following:    Height as of 3/7/24: 1.803 m (5' 11\").    Weight as of this encounter: 100.7 kg (222 lb).   Last 3 BP:   BP Readings from Last 3 Encounters:   03/07/24 (!) 144/97   02/23/24 124/87   02/09/24 (!) 137/101       History   Smoking Status    Never   Smokeless Tobacco    Never       Labs:  Lab Results " "  Component Value Date    A1C 6.6 06/04/2024     Lab Results   Component Value Date     08/09/2023    GLC 90 08/08/2018     Lab Results   Component Value Date    LDL  08/09/2023      Comment:      Cannot estimate LDL when triglyceride exceeds 400 mg/dL    LDL 93 08/09/2023     08/08/2018     Direct Measure HDL   Date Value Ref Range Status   08/09/2023 33 (L) >=40 mg/dL Final   ]  GFR Estimate   Date Value Ref Range Status   08/09/2023 >90 >60 mL/min/1.73m2 Final   08/08/2018 >60 >60 mL/min/1.73m2 Final     GFR Estimate If Black   Date Value Ref Range Status   08/08/2018 >60 >60 mL/min/1.73m2 Final     Lab Results   Component Value Date    CR 0.98 08/09/2023     No results found for: \"MICROALBUMIN\"    Healthy Eating:  Healthy Eating Assessed Today: Yes  Cultural/Bahai diet restrictions?: No  Do you have any food allergies or intolerances?: No  Meal planning/habits: Smaller portions, Avoiding sweets  Who cooks/prepares meals for you?: Self, Spouse  Who purchases food in  your home?: Spouse  Meals include: Breakfast, Lunch, Dinner, Afternoon Snack  Breakfast: 3 eggs  Lunch: leftovers usually - typically largest meal of day  Dinner: +protein + vegetable sometimes ( not a big fan of veggies)  proteins - chicken , beef , venison  Snacks: sunflower seeds, jerky, nuts  Other: veggies he does like / will eat: carrots, potatoes, pickles, mushrooms, cooked onions  Beverages: Water, Juice, Milk, Tea, Sports drinks, Alcohol, Other (see Comments)  Please elaborate:: green tea, alcohol: Carbliss, Viviane Lite, apple crown and 7  Has patient met with a dietitian in the past?: No    Being Active:  Being Active Assessed Today: Yes  Exercise:: Yes (Softball on Mon, Volleyball on Thur - working on renovations for house and has cabin he goes to on [place])  Days per week of moderate to strenuous exercise (like a brisk walk): 3  On average, minutes per day of exercise at this level: 50  How intense was your typical exercise? " : Heavy (like jogging or swimming)  Exercise Minutes per Week: 150  Barrier to exercise: None    Monitoring:  Monitoring Assessed Today: Yes  Did patient bring glucose meter to appointment? : No (forgot, came straight from work)  Blood Glucose Meter: Accu-chek (provided today)  Times checking blood sugar at home (number): 1  Times checking blood sugar at home (per): Day    Unfortunately Brent did not have his meter or any blood glucose data with him today so all blood glucose information came solely from his recall alone.  Per his report he is checking his blood glucose once daily and has been alternating times between a.m. fasting and 2 hours after dinner.  A.m. fasting readings have ranged anywhere from the upper 90's to 145 mg/dL (which was the highest), today's reading was 138 mg/dL and  his postmeal #'s in the 120's to 160's (last evening was 128 mg/dL).     Taking Medications:  Diabetes Medication(s)       Biguanides       metFORMIN (GLUCOPHAGE) 500 MG tablet Take 1 tablet (500 mg) by mouth daily (with dinner)            Taking Medication Assessed Today: Yes  Current Treatments: Oral Medication (taken by mouth), Diet  Problems taking diabetes medications regularly?: No  Diabetes medication side effects?: No    Problem Solving:  Problem Solving Assessed Today: Yes  Is the patient at risk for hypoglycemia?: No  Is the patient at risk for DKA?: No  Does patient have severe weather/disaster plan for diabetes management?: Not Needed  Does patient have sick day plan for diabetes management?: Not Needed              Reducing Risks:  Reducing Risks Assessed Today: Yes  Diabetes Risks: Sedentary Lifestyle, Hypertriglyceridemia  CAD Risks: Male sex, Sedentary lifestyle, Diabetes Mellitus, Hypertension, Obesity    Healthy Coping:  Healthy Coping Assessed Today: Yes  Emotional response to diabetes: Ready to learn  Informal Support system:: Spouse  Stage of change: ACTION (Actively working towards change)  Support resources:  In-person Offerings  Patient Activation Measure Survey Score:       No data to display                  Care Plan and Education Provided:  Healthy Eating: Balanced meals, Consistency in amount and timing of carbohydrate intake, Eating out, Portion control, and Weight Management, Being Active: Amount recommended (150 minutes moderate or 75 minutes vigorous activity and 2-3 days strength training per week) and Relationship of activity to glucose, Monitoring: Individual glucose targets and Purpose, Taking Medication: Action of prescribed medication(s) and When to take medication(s), Reducing Risks: Eye care, Goal for A1c, how it relates to glucose and how often to check, Preventing cardiovascular disease, including blood pressure goals, lipid goals, recommendations for cardioprotective medications, statins, and aspirin, Prevention, early diagnostic measures and treatment of complications, and A1C - goals, relating to blood glucose levels, how often to check, and Healthy Coping: Benefits of making appropriate lifestyle changes, Identifying helpful resources, and Utilize support systems  Care Plan: Diabetes   Updates made by Yue Vanegas RN since 6/5/2024 12:00 AM        Problem: HbA1C Not In Goal         Goal: Establish Regular Follow-Ups with PCP    This Visit's Progress: 80%   Recent Progress: 60%   Note:    Future follow-up in place with [facility] only       Goal: Get HbA1C Level in Goal    This Visit's Progress: 100%   Recent Progress: 70%   Note:    Current A1c = 6.6% from previous 9.9%       Problem: Diabetes Self-Management Education Needed to Optimize Self-Care Behaviors         Goal: Healthy Eating - follow a healthy eating pattern for diabetes    This Visit's Progress: 80%   Recent Progress: 70%   Note:    Reassess at follow-up       Goal: Being Active - get regular physical activity, working up to at least 150 minutes per week    This Visit's Progress: 90%   Recent Progress: 40%        Goal: Taking  Medication - patient is consistently taking medications as directed    This Visit's Progress: 100%   Recent Progress: 100%   Note:    Reports having no missed or skipped doses and is tolerating well       Goal: Healthy Coping - use available resources to cope with the challenges of managing diabetes    This Visit's Progress: 80%   Recent Progress: 60%          Thank you,  Yue Martin RN [facility]  Certified Diabetes Care and   Visit type : DSMT      Time Spent: 30 minutes  Encounter Type: Individual    Any diabetes medication dose changes were made via the CDE Protocol per the patient's referring provider and primary care provider. A copy of this encounter was shared with the provider.   Much or all of the text in this note was generated through the use of the Dragon Dictate voice-to-text software.Errors in spelling or words which seem out of context are unintentional. Sound alike errors, in particular, may have escaped editing.

## 2024-07-01 DIAGNOSIS — R86.9 ABNORMAL SEMEN ANALYSIS: Primary | ICD-10-CM

## 2024-07-17 ENCOUNTER — LAB (OUTPATIENT)
Dept: LAB | Facility: CLINIC | Age: 39
End: 2024-07-17
Payer: COMMERCIAL

## 2024-07-17 DIAGNOSIS — R86.9 ABNORMAL SEMEN ANALYSIS: ICD-10-CM

## 2024-07-17 PROCEDURE — 89322 SEMEN ANAL STRICT CRITERIA: CPT

## 2024-07-18 LAB
ABNORMAL SPERM MORPHOLOGY: 100
ABSTINENCE DAYS: 4 DAYS (ref 2–7)
AGGLUTINATION: NO
ANALYSIS TEMP - CENTIGRADE: 22 CENTIGRADE
COLLECTION METHOD: ABNORMAL
COLLECTION SITE: ABNORMAL
CONSENT TO RELEASE TO PARTNER: YES
DAL- RECEIVED TIME: ABNORMAL
HEAD DEFECT: 100 %
IMMOTILE: 45 %
LIQUEFIED: YES
MIDPIECE DEFECT: 49 %
NON-PROGRESSIVE MOTILITY: 5 %
NORMAL SPERM MORPHOLOGY: 0 % NORMAL FORMS
PROGRESSIVE MOTILITY: 50 %
ROUND CELLS: 0.7 MILLION/ML
SPECIMEN PH: 7.6 PH
SPECIMEN VOLUME: 2.5 ML
SPERM CONCENTRATION: 55.8 MILLION/ML
TAIL DEFECT: 3 %
TIME OF ANALYSIS: ABNORMAL
TOTAL PROGRESSIVE MOTILE NUMBER: 70 MILLION
TOTAL SPERM NUMBER: 140 MILLION
VISCOUS: NO
VITALITY: ABNORMAL

## 2024-07-23 NOTE — RESULT ENCOUNTER NOTE
Dear Bretn,     Here are your recent results.     Semen analysis results are pretty identical to the previous 2 tests earlier this year.  No significant changes.  Sperm shape remains low.  There is not any one particular cure for isolated low sperm shape.  It is not clear that sperm shape being low is what is preventing your fertility, either.  The lab only measures 200 sperm out of the millions present to generate this morphology number.    I recommend you look into assisted reproductive technologies with a female fertility center.  You can consider IUI or IVF as treatments to select out more normal sperm for use in reproduction.    I recheck [employer] over-the-counter fertility supplement, if you are not already taking this.    Please let us know if you have any questions or concerns.     Antolin ECKERT 
23-Jul-2019 00:17

## 2024-09-12 DIAGNOSIS — E11.9 DIABETES MELLITUS, TYPE 2 (H): Primary | ICD-10-CM

## 2024-09-17 ENCOUNTER — LAB (OUTPATIENT)
Dept: LAB | Facility: CLINIC | Age: 39
End: 2024-09-17
Payer: COMMERCIAL

## 2024-09-17 ENCOUNTER — ALLIED HEALTH/NURSE VISIT (OUTPATIENT)
Dept: EDUCATION SERVICES | Facility: CLINIC | Age: 39
End: 2024-09-17
Payer: COMMERCIAL

## 2024-09-17 VITALS — BODY MASS INDEX: 31.1 KG/M2 | WEIGHT: 223 LBS

## 2024-09-17 DIAGNOSIS — E11.9 DIABETES MELLITUS, TYPE 2 (H): ICD-10-CM

## 2024-09-17 DIAGNOSIS — E11.9 DIABETES MELLITUS, TYPE 2 (H): Primary | ICD-10-CM

## 2024-09-17 LAB
CHOLEST SERPL-MCNC: 246 MG/DL
FASTING STATUS PATIENT QL REPORTED: YES
HBA1C MFR BLD: 6.2 % (ref 0–5.6)
HDLC SERPL-MCNC: 33 MG/DL
LDLC SERPL CALC-MCNC: ABNORMAL MG/DL
LDLC SERPL DIRECT ASSAY-MCNC: 118 MG/DL
NONHDLC SERPL-MCNC: 213 MG/DL
TRIGL SERPL-MCNC: 658 MG/DL

## 2024-09-17 PROCEDURE — 36415 COLL VENOUS BLD VENIPUNCTURE: CPT

## 2024-09-17 PROCEDURE — 83036 HEMOGLOBIN GLYCOSYLATED A1C: CPT

## 2024-09-17 PROCEDURE — G0108 DIAB MANAGE TRN  PER INDIV: HCPCS

## 2024-09-17 PROCEDURE — 83721 ASSAY OF BLOOD LIPOPROTEIN: CPT

## 2024-09-17 PROCEDURE — 80061 LIPID PANEL: CPT

## 2024-09-17 NOTE — LETTER
9/17/2024         RE: Vinayak Jarquin  [address] [place] [id number]        Dear Colleague,    Thank you for referring your patient, Vinayak Jarquin, to the [facility]. Please see a copy of my visit note below.    Diabetes Self-Management Education & Support    Presents for: Individual review    Type of Service: In Person Visit      ASSESSMENT:  Brent is a very pleasant 39-year-old who returns to clinic today to review blood glucose, medications, recheck A1c, and to assess/assist him with his current diabetes self-management skills.  He arrived today unaccompanied and unfortunately he did not have any blood glucose data with him for review.  Reports he has not been checking his BG at home.  Medications were reviewed and he confirms he continues to take 500 mg of metformin daily-does occasionally forget to take with dinner.  Prior to our visit today we did recheck his A1c and was very pleased to see it returned even lower than his previous 6.6% to 6.2%.  I congratulated him on this.  Brent is eating 3 meals a day which appear to be well-balanced and eating in a timely fashion, and keeps very active.  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  Prior to our visit today I did review his most recent office visit notes as well as lab results and noted that it has been over 1 year since his lipids have been checked.  Noted that both his total cholesterol as well as triglycerides were elevated.  Other than this finding, overall he continues to do well.  Agreed to meet back up in clinic in 6 months and I instructed him to call with any questions or concerns that come up before that time.    Patient's most recent   Lab Results   Component Value Date    A1C 6.2 09/17/2024     is meeting goal of <7.0    Diabetes knowledge and skills assessment:   Patient is knowledgeable in diabetes management concepts related to:  "Healthy Eating, Being Active, Reducing Risks, and Healthy Coping    Continue education with the following diabetes management concepts: Healthy Eating, Monitoring, and Reducing Risks    Based on learning assessment above, most appropriate setting for further diabetes education would be: Individual setting.      PLAN    See Patient Instructions for co-developed, patient-stated behavior change goals.  AVS printed and provided to patient today. See Follow-Up section for recommended follow-up.       Topics to cover at upcoming visits: Healthy Eating, Monitoring, Taking Medication, Reducing Risks, and Healthy Coping    Follow-up: 2/4/2025    See Care Plan for co-developed, patient-state behavior change goals.  AVS provided for patient today.    Education Materials Provided:  Cholesterol, Triglycerides      SUBJECTIVE/OBJECTIVE:  Presents for: Individual review  Accompanied by: Self  Diabetes education in the past 24 mo: Yes (LV 6/4/24)  Focus of Visit: Monitoring, Reducing Risks, Healthy Coping, Healthy Eating, Being Active, Assistance w/ making life changes, Self-care behavioral goal setting  Diabetes type: Type 2  Date of diagnosis: 2023  Disease course: Stable  Transportation concerns: No  Difficulty affording diabetes medication?: No  Difficulty affording diabetes testing supplies?: No  Other concerns:: None  Cultural Influences/Ethnic Background:  Choose not to answer      Diabetes Symptoms & Complications:  Diabetes Related Symptoms: None  Weight trend: Decreasing  Disease course: Stable  Complications assessed today?: No    Patient Problem List and Family Medical History reviewed for relevant medical history, current medical status, and diabetes risk factors.    Vitals:  Wt 101.2 kg (223 lb)   BMI 31.10 kg/m    Estimated body mass index is 31.1 kg/m  as calculated from the following:    Height as of 3/7/24: 1.803 m (5' 11\").    Weight as of this encounter: 101.2 kg (223 lb).   Last 3 BP:   BP Readings from Last 3 " "Encounters:   03/07/24 (!) 144/97   02/23/24 124/87   02/09/24 (!) 137/101       History   Smoking Status     Never   Smokeless Tobacco     Never       Labs:  Lab Results   Component Value Date    A1C 6.2 09/17/2024     Lab Results   Component Value Date     08/09/2023    GLC 90 08/08/2018     Lab Results   Component Value Date    LDL  08/09/2023      Comment:      Cannot estimate LDL when triglyceride exceeds 400 mg/dL    LDL 93 08/09/2023     08/08/2018     Direct Measure HDL   Date Value Ref Range Status   08/09/2023 33 (L) >=40 mg/dL Final   ]  GFR Estimate   Date Value Ref Range Status   08/09/2023 >90 >60 mL/min/1.73m2 Final   08/08/2018 >60 >60 mL/min/1.73m2 Final     GFR Estimate If Black   Date Value Ref Range Status   08/08/2018 >60 >60 mL/min/1.73m2 Final     Lab Results   Component Value Date    CR 0.98 08/09/2023     No results found for: \"MICROALBUMIN\"    Healthy Eating:  Healthy Eating Assessed Today: Yes  Cultural/Pentecostalism diet restrictions?: No  Do you have any food allergies or intolerances?: No  Meal planning/habits: Smaller portions, Avoiding sweets  Who cooks/prepares meals for you?: Self, Spouse  Who purchases food in  your home?: Spouse  Meals include: Breakfast, Lunch, Dinner, Afternoon Snack  Breakfast: 3 eggs with either walton or sausage  Lunch: leftovers usually - typically largest meal of day  Dinner: +protein + vegetable sometimes ( not a big fan of veggies)  proteins - chicken , beef , venison - will have vegetables with something like fajitas  Snacks: sunflower seeds, jerky, nuts  Other: veggies he does like / will eat: carrots, potatoes, pickles, mushrooms, cooked onions  Beverages: Water, Milk, Tea, Sports drinks, Alcohol, Other (see Comments)  Please elaborate:: Carbliss Gatorade zero  Has patient met with a dietitian in the past?: No    Being Active:  Being Active Assessed Today: Yes  Exercise:: Yes (Softball on Mon, Volleyball on Thur - working on [employer] for " house and has [place] he goes [place] on [place])  Days per week of moderate to strenuous exercise (like a brisk walk): 7  On average, minutes per day of exercise at this level: 60  How intense was your typical exercise? : Moderate (like brisk walking)  Exercise Minutes per Week: 420  Barrier to exercise: None    Monitoring:  Monitoring Assessed Today: Yes  Did patient bring glucose meter to appointment? : No (forgot, came straight from work)  Blood Glucose Meter: Accu-chek (provided today)  Times checking blood sugar at home (number): Never    Unfortunately we had no BG data to review today as Brent is not currently monitoring at home    Taking Medications:  Diabetes Medication(s)       Biguanides       metFORMIN (GLUCOPHAGE) 500 MG tablet Take 1 tablet (500 mg) by mouth daily (with dinner)            Taking Medication Assessed Today: Yes  Current Treatments: Oral Medication (taken by mouth), Diet  Problems taking diabetes medications regularly?: No  Diabetes medication side effects?: No    Problem Solving:  Problem Solving Assessed Today: Yes  Is the patient at risk for hypoglycemia?: No  Is the patient at risk for DKA?: No  Does patient have severe weather/disaster plan for diabetes management?: Not Needed  Does patient have sick day plan for diabetes management?: Not Needed              Reducing Risks:  Reducing Risks Assessed Today: Yes  Diabetes Risks: Sedentary Lifestyle, Hypertriglyceridemia  CAD Risks: Male sex, Sedentary lifestyle, Diabetes Mellitus, Hypertension, Obesity    Healthy Coping:  Healthy Coping Assessed Today: Yes  Emotional response to diabetes: Ready to learn  Informal Support system:: Spouse  Stage of change: ACTION (Actively working towards change)  Support resources: In-person Offerings  Patient Activation Measure Survey Score:       No data to display                  Care Plan and Education Provided:  Healthy Eating: Balanced meals, Consistency in amount and timing of carbohydrate intake, Heart  healthy diet, and Portion control, Being Active: Amount recommended (150 minutes moderate or 75 minutes vigorous activity and 2-3 days strength training per week) and Relationship of activity to glucose, Monitoring: Individual glucose targets, Problem Solving: , Reducing Risks: Complications of diabetes, Goal for A1c, how it relates to glucose and how often to check, Preventing cardiovascular disease, including blood pressure goals, lipid goals, recommendations for cardioprotective medications, statins, and aspirin, Prevention, early diagnostic measures and treatment of complications, and A1C - goals, relating to blood glucose levels, how often to check, and Healthy Coping: Benefits of making appropriate lifestyle changes, Identifying helpful resources, and Utilize support systems    Thank you,  Yue Martin RN [facility]  Certified Diabetes Care and   Visit type : DSMT    Time Spent: 30 minutes  Encounter Type: Individual    Any diabetes medication dose changes were made via the CDE Protocol per the patient's referring provider and primary care provider. A copy of this encounter was shared with the provider.   Much or all of the text in this note was generated through the use of the Dragon Dictate voice-to-text software.Errors in spelling or words which seem out of context are unintentional. Sound alike errors, in particular, may have escaped editing.

## 2024-09-17 NOTE — PATIENT INSTRUCTIONS
1. Eat 3 balanced meals each day - Monitor carb intake and limit to 60-75 grams per meal  This would be equal to 4-5 choices ~  1 choice = 15 grams    Do not wait longer than 4-5 hours to eat something  Snacks limit to no more than 30 grams of carbohydrates or 2 choices  Make sure you include protein source with each meal and at bedtime - this has been shown to help with blood glucose elevations    2.  Check blood sugars once each day - please try and alternate the times you check between am fasting( right after you wake before eating) and 2 hours AFTER dinner - this allows us to get a better idea of what is happening throughout your day , not at just one time     Fasting and before meal target is 80 - 130   2 hours after a meal target is < 180  remember to bring meter and log book to all appointments    3. Incorporate 30 minutes activity into each day - does not need to be all at one time & walking counts    4. Take diabetes medications as prescribed Continue Metformin 500 mg daily      Thank you for coming in to see me today !      I value your experience and would be very thankful for your time in providing feedback in our clinic survey.    You may receive an e-mail, text message or even something in the mail from [email or web address].  This is a survey to let us know how we are doing - the survey will be related to your diabetes education and me.

## 2024-09-17 NOTE — PROGRESS NOTES
Diabetes Self-Management Education & Support    Presents for: Individual review    Type of Service: In Person Visit      ASSESSMENT:  Brent is a very pleasant 39-year-old who returns to clinic today to review blood glucose, medications, recheck A1c, and to assess/assist him with his current diabetes self-management skills.  He arrived today unaccompanied and unfortunately he did not have any blood glucose data with him for review.  Reports he has not been checking his BG at home.  Medications were reviewed and he confirms he continues to take 500 mg of metformin daily-does occasionally forget to take with dinner.  Prior to our visit today we did recheck his A1c and was very pleased to see it returned even lower than his previous 6.6% to 6.2%.  I congratulated him on this.  Brent is eating 3 meals a day which appear to be well-balanced and eating in a timely fashion, and keeps very active.  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  Prior to our visit today I did review his most recent office visit notes as well as lab results and noted that it has been over 1 year since his lipids have been checked.  Noted that both his total cholesterol as well as triglycerides were elevated.  Other than this finding, overall he continues to do well.  Agreed to meet back up in clinic in 6 months and I instructed him to call with any questions or concerns that come up before that time.    Patient's most recent   Lab Results   Component Value Date    A1C 6.2 09/17/2024     is meeting goal of <7.0    Diabetes knowledge and skills assessment:   Patient is knowledgeable in diabetes management concepts related to: Healthy Eating, Being Active, Reducing Risks, and Healthy Coping    Continue education with the following diabetes management concepts: Healthy Eating, Monitoring, and Reducing Risks    Based on learning assessment above, most appropriate setting for further  "diabetes education would be: Individual setting.      PLAN    See Patient Instructions for co-developed, patient-stated behavior change goals.  AVS printed and provided to patient today. See Follow-Up section for recommended follow-up.       Topics to cover at upcoming visits: Healthy Eating, Monitoring, Taking Medication, Reducing Risks, and Healthy Coping    Follow-up: 2/4/2025    See Care Plan for co-developed, patient-state behavior change goals.  AVS provided for patient today.    Education Materials Provided:  Cholesterol, Triglycerides      SUBJECTIVE/OBJECTIVE:  Presents for: Individual review  Accompanied by: Self  Diabetes education in the past 24 mo: Yes (LV 6/4/24)  Focus of Visit: Monitoring, Reducing Risks, Healthy Coping, Healthy Eating, Being Active, Assistance w/ making life changes, Self-care behavioral goal setting  Diabetes type: Type 2  Date of diagnosis: 2023  Disease course: Stable  Transportation concerns: No  Difficulty affording diabetes medication?: No  Difficulty affording diabetes testing supplies?: No  Other concerns:: None  Cultural Influences/Ethnic Background:  Choose not to answer      Diabetes Symptoms & Complications:  Diabetes Related Symptoms: None  Weight trend: Decreasing  Disease course: Stable  Complications assessed today?: No    Patient Problem List and Family Medical History reviewed for relevant medical history, current medical status, and diabetes risk factors.    Vitals:  Wt 101.2 kg (223 lb)   BMI 31.10 kg/m    Estimated body mass index is 31.1 kg/m  as calculated from the following:    Height as of 3/7/24: 1.803 m (5' 11\").    Weight as of this encounter: 101.2 kg (223 lb).   Last 3 BP:   BP Readings from Last 3 Encounters:   03/07/24 (!) 144/97   02/23/24 124/87   02/09/24 (!) 137/101       History   Smoking Status    Never   Smokeless Tobacco    Never       Labs:  Lab Results   Component Value Date    A1C 6.2 09/17/2024     Lab Results   Component Value Date    " " 08/09/2023    GLC 90 08/08/2018     Lab Results   Component Value Date    LDL  08/09/2023      Comment:      Cannot estimate LDL when triglyceride exceeds 400 mg/dL    LDL 93 08/09/2023     08/08/2018     Direct Measure HDL   Date Value Ref Range Status   08/09/2023 33 (L) >=40 mg/dL Final   ]  GFR Estimate   Date Value Ref Range Status   08/09/2023 >90 >60 mL/min/1.73m2 Final   08/08/2018 >60 >60 mL/min/1.73m2 Final     GFR Estimate If Black   Date Value Ref Range Status   08/08/2018 >60 >60 mL/min/1.73m2 Final     Lab Results   Component Value Date    CR 0.98 08/09/2023     No results found for: \"MICROALBUMIN\"    Healthy Eating:  Healthy Eating Assessed Today: Yes  Cultural/Lutheran diet restrictions?: No  Do you have any food allergies or intolerances?: No  Meal planning/habits: Smaller portions, Avoiding sweets  Who cooks/prepares meals for you?: Self, Spouse  Who purchases food in  your home?: Spouse  Meals include: Breakfast, Lunch, Dinner, Afternoon Snack  Breakfast: 3 eggs with either walton or sausage  Lunch: leftovers usually - typically largest meal of day  Dinner: +protein + vegetable sometimes ( not a big fan of veggies)  proteins - chicken , beef , venison - will have vegetables with something like fajitas  Snacks: sunflower seeds, jerky, nuts  Other: veggies he does like / will eat: carrots, potatoes, pickles, mushrooms, cooked onions  Beverages: Water, Milk, Tea, Sports drinks, Alcohol, Other (see Comments)  Please elaborate:: Jazz Frenchde zero  Has patient met with a dietitian in the past?: No    Being Active:  Being Active Assessed Today: Yes  Exercise:: Yes (Softball on Mon, Volleyball on Thur - working on renovations for house and has [place] he [place] on [place])  Days per week of moderate to strenuous exercise (like a brisk walk): 7  On average, minutes per day of exercise at this level: 60  How intense was your typical exercise? : Moderate (like brisk walking)  Exercise Minutes " per Week: 420  Barrier to exercise: None    Monitoring:  Monitoring Assessed Today: Yes  Did patient bring glucose meter to appointment? : No (forgot, came straight from work)  Blood Glucose Meter: Accu-chek (provided today)  Times checking blood sugar at home (number): Never    Unfortunately we had no BG data to review today as Brent is not currently monitoring at home    Taking Medications:  Diabetes Medication(s)       Biguanides       metFORMIN (GLUCOPHAGE) 500 MG tablet Take 1 tablet (500 mg) by mouth daily (with dinner)            Taking Medication Assessed Today: Yes  Current Treatments: Oral Medication (taken by mouth), Diet  Problems taking diabetes medications regularly?: No  Diabetes medication side effects?: No    Problem Solving:  Problem Solving Assessed Today: Yes  Is the patient at risk for hypoglycemia?: No  Is the patient at risk for DKA?: No  Does patient have severe weather/disaster plan for diabetes management?: Not Needed  Does patient have sick day plan for diabetes management?: Not Needed              Reducing Risks:  Reducing Risks Assessed Today: Yes  Diabetes Risks: Sedentary Lifestyle, Hypertriglyceridemia  CAD Risks: Male sex, Sedentary lifestyle, Diabetes Mellitus, Hypertension, Obesity    Healthy Coping:  Healthy Coping Assessed Today: Yes  Emotional response to diabetes: Ready to learn  Informal Support system:: Spouse  Stage of change: ACTION (Actively working towards change)  Support resources: In-person Offerings  Patient Activation Measure Survey Score:       No data to display                  Care Plan and Education Provided:  Healthy Eating: Balanced meals, Consistency in amount and timing of carbohydrate intake, Heart healthy diet, and Portion control, Being Active: Amount recommended (150 minutes moderate or 75 minutes vigorous activity and 2-3 days strength training per week) and Relationship of activity to glucose, Monitoring: Individual glucose targets, Problem Solving: ,  Reducing Risks: Complications of diabetes, Goal for A1c, how it relates to glucose and how often to check, Preventing cardiovascular disease, including blood pressure goals, lipid goals, recommendations for cardioprotective medications, statins, and aspirin, Prevention, early diagnostic measures and treatment of complications, and A1C - goals, relating to blood glucose levels, how often to check, and Healthy Coping: Benefits of making appropriate lifestyle changes, Identifying helpful resources, and Utilize support systems    Thank you,  Yue Martin RN [facility]  Certified Diabetes Care and   Visit type : DSMT    Time Spent: 30 minutes  Encounter Type: Individual    Any diabetes medication dose changes were made via the CDE Protocol per the patient's referring provider and primary care provider. A copy of this encounter was shared with the provider.   Much or all of the text in this note was generated through the use of the Dragon Dictate voice-to-text software.Errors in spelling or words which seem out of context are unintentional. Sound alike errors, in particular, may have escaped editing.

## 2024-09-17 NOTE — RESULT ENCOUNTER NOTE
The 10-year ASCVD risk score (Talita MENDES, et al., 2019) is: 7.2%  Patient updated by [employer] message with lab results.      Lidia Kennedy,  Thank you for meeting with Yue earlier today.  She did update your cholesterol panel which shows that your triglycerides are still quite elevated along with your cholesterol readings.  Typically all patients with diabetes at the age of 40, we recommend a cholesterol medication regardless of labs.  Especially with elevated triglycerides, I would recommend initiation of a cholesterol-lowering medication (statin).  Please send me a message if you are open to trialing this medication.  Malu Pearson, DO

## 2024-11-15 ENCOUNTER — TELEPHONE (OUTPATIENT)
Dept: FAMILY MEDICINE | Facility: CLINIC | Age: 39
End: 2024-11-15

## 2024-11-15 NOTE — TELEPHONE ENCOUNTER
Patient Quality Outreach    Patient is due for the following:   Hypertension -  BP check    Action(s) Taken:   Schedule a nurse only visit for Blood Pressure    Type of outreach:    Sent [employer] message.    Questions for provider review:    None           Jasvir Gastelum MA

## 2024-12-08 ENCOUNTER — HEALTH MAINTENANCE LETTER (OUTPATIENT)
Age: 39
End: 2024-12-08

## 2025-01-18 ENCOUNTER — HEALTH MAINTENANCE LETTER (OUTPATIENT)
Age: 40
End: 2025-01-18

## 2025-02-02 DIAGNOSIS — E11.9 DIABETES MELLITUS, TYPE 2 (H): Primary | ICD-10-CM

## 2025-04-27 ENCOUNTER — HEALTH MAINTENANCE LETTER (OUTPATIENT)
Age: 40
End: 2025-04-27

## 2025-08-10 ENCOUNTER — HEALTH MAINTENANCE LETTER (OUTPATIENT)
Age: 40
End: 2025-08-10